# Patient Record
Sex: FEMALE | Race: WHITE | Employment: OTHER | URBAN - METROPOLITAN AREA
[De-identification: names, ages, dates, MRNs, and addresses within clinical notes are randomized per-mention and may not be internally consistent; named-entity substitution may affect disease eponyms.]

---

## 2017-01-04 ENCOUNTER — TELEPHONE (OUTPATIENT)
Dept: INTERNAL MEDICINE | Age: 77
End: 2017-01-04

## 2017-01-11 RX ORDER — RALOXIFENE HYDROCHLORIDE 60 MG/1
TABLET, FILM COATED ORAL
Qty: 90 TABLET | Refills: 3 | Status: SHIPPED | OUTPATIENT
Start: 2017-01-11 | End: 2017-10-05 | Stop reason: SDUPTHER

## 2017-01-19 ENCOUNTER — TELEPHONE (OUTPATIENT)
Dept: INTERNAL MEDICINE | Age: 77
End: 2017-01-19

## 2017-01-23 ENCOUNTER — TELEPHONE (OUTPATIENT)
Dept: INTERNAL MEDICINE | Age: 77
End: 2017-01-23

## 2017-01-25 RX ORDER — TEMAZEPAM 30 MG/1
30 CAPSULE ORAL NIGHTLY PRN
Qty: 90 CAPSULE | Refills: 0 | OUTPATIENT
Start: 2017-01-25 | End: 2017-05-27 | Stop reason: SDUPTHER

## 2017-01-25 RX ORDER — TEMAZEPAM 30 MG/1
CAPSULE ORAL
COMMUNITY
End: 2017-01-25 | Stop reason: SDUPTHER

## 2017-03-06 ENCOUNTER — OFFICE VISIT (OUTPATIENT)
Dept: INTERNAL MEDICINE | Age: 77
End: 2017-03-06

## 2017-03-06 VITALS
TEMPERATURE: 98 F | SYSTOLIC BLOOD PRESSURE: 120 MMHG | HEIGHT: 63 IN | BODY MASS INDEX: 23.39 KG/M2 | DIASTOLIC BLOOD PRESSURE: 70 MMHG | WEIGHT: 132 LBS

## 2017-03-06 DIAGNOSIS — J40 BRONCHITIS: ICD-10-CM

## 2017-03-06 DIAGNOSIS — I10 ESSENTIAL HYPERTENSION: Chronic | ICD-10-CM

## 2017-03-06 PROCEDURE — G8420 CALC BMI NORM PARAMETERS: HCPCS | Performed by: INTERNAL MEDICINE

## 2017-03-06 PROCEDURE — 1090F PRES/ABSN URINE INCON ASSESS: CPT | Performed by: INTERNAL MEDICINE

## 2017-03-06 PROCEDURE — 1036F TOBACCO NON-USER: CPT | Performed by: INTERNAL MEDICINE

## 2017-03-06 PROCEDURE — G8400 PT W/DXA NO RESULTS DOC: HCPCS | Performed by: INTERNAL MEDICINE

## 2017-03-06 PROCEDURE — 1123F ACP DISCUSS/DSCN MKR DOCD: CPT | Performed by: INTERNAL MEDICINE

## 2017-03-06 PROCEDURE — G8484 FLU IMMUNIZE NO ADMIN: HCPCS | Performed by: INTERNAL MEDICINE

## 2017-03-06 PROCEDURE — 99213 OFFICE O/P EST LOW 20 MIN: CPT | Performed by: INTERNAL MEDICINE

## 2017-03-06 PROCEDURE — G8598 ASA/ANTIPLAT THER USED: HCPCS | Performed by: INTERNAL MEDICINE

## 2017-03-06 PROCEDURE — G8428 CUR MEDS NOT DOCUMENT: HCPCS | Performed by: INTERNAL MEDICINE

## 2017-03-06 PROCEDURE — 4040F PNEUMOC VAC/ADMIN/RCVD: CPT | Performed by: INTERNAL MEDICINE

## 2017-03-06 RX ORDER — PROMETHAZINE HYDROCHLORIDE AND CODEINE PHOSPHATE 6.25; 1 MG/5ML; MG/5ML
5 SYRUP ORAL EVERY 4 HOURS PRN
Qty: 240 ML | Refills: 1 | Status: SHIPPED | OUTPATIENT
Start: 2017-03-06 | End: 2017-05-30 | Stop reason: ALTCHOICE

## 2017-03-06 RX ORDER — DOXYCYCLINE HYCLATE 100 MG
100 TABLET ORAL 2 TIMES DAILY
Qty: 20 TABLET | Refills: 0 | Status: SHIPPED | OUTPATIENT
Start: 2017-03-06 | End: 2017-03-16

## 2017-03-09 ENCOUNTER — TELEPHONE (OUTPATIENT)
Dept: INTERNAL MEDICINE | Age: 77
End: 2017-03-09

## 2017-03-09 RX ORDER — ATORVASTATIN CALCIUM 10 MG/1
TABLET, FILM COATED ORAL
Qty: 90 TABLET | Refills: 2 | Status: SHIPPED | OUTPATIENT
Start: 2017-03-09 | End: 2017-05-30 | Stop reason: SDUPTHER

## 2017-03-14 PROBLEM — J40 BRONCHITIS: Status: ACTIVE | Noted: 2017-03-14

## 2017-03-14 ASSESSMENT — ENCOUNTER SYMPTOMS
SHORTNESS OF BREATH: 0
WHEEZING: 0
ABDOMINAL PAIN: 0
CHEST TIGHTNESS: 1
COUGH: 1

## 2017-05-31 RX ORDER — TEMAZEPAM 30 MG/1
30 CAPSULE ORAL NIGHTLY PRN
Qty: 90 CAPSULE | Refills: 0 | OUTPATIENT
Start: 2017-05-31 | End: 2017-10-25 | Stop reason: SDUPTHER

## 2017-07-10 ENCOUNTER — TELEPHONE (OUTPATIENT)
Dept: INTERNAL MEDICINE | Age: 77
End: 2017-07-10

## 2017-07-10 DIAGNOSIS — M81.0 OSTEOPOROSIS: Chronic | ICD-10-CM

## 2017-08-07 ENCOUNTER — HOSPITAL ENCOUNTER (OUTPATIENT)
Dept: ONCOLOGY | Age: 77
Discharge: HOME | End: 2017-07-21
Attending: INTERNAL MEDICINE | Admitting: INTERNAL MEDICINE

## 2017-08-14 ENCOUNTER — HOSPITAL ENCOUNTER (OUTPATIENT)
Dept: ONCOLOGY | Age: 77
Discharge: HOME | End: 2017-08-14
Attending: INTERNAL MEDICINE | Admitting: INTERNAL MEDICINE

## 2017-08-14 VITALS
DIASTOLIC BLOOD PRESSURE: 73 MMHG | TEMPERATURE: 98 F | HEART RATE: 68 BPM | RESPIRATION RATE: 16 BRPM | SYSTOLIC BLOOD PRESSURE: 121 MMHG

## 2017-08-14 RX ORDER — LANOLIN ALCOHOL/MO/W.PET/CERES
1000 CREAM (GRAM) TOPICAL DAILY
COMMUNITY

## 2017-10-04 ENCOUNTER — TELEPHONE (OUTPATIENT)
Dept: INTERNAL MEDICINE | Age: 77
End: 2017-10-04

## 2017-10-04 DIAGNOSIS — I10 ESSENTIAL HYPERTENSION: Chronic | ICD-10-CM

## 2017-10-04 NOTE — TELEPHONE ENCOUNTER
Patient now is going to [place] they are asking if we can send overall the prescription dr. Nighat Cervantes was giving the patient  Please call [facility] pharmacy [phone]

## 2017-10-05 RX ORDER — TEMAZEPAM 30 MG/1
30 CAPSULE ORAL NIGHTLY PRN
Qty: 30 CAPSULE | Refills: 0 | Status: CANCELLED | OUTPATIENT
Start: 2017-10-05

## 2017-10-05 NOTE — TELEPHONE ENCOUNTER
Last seen 3/2017. I called the pharmacy to find out which prescriptions were needed. Aware will only be a 30 day with R1. Left a detailed message for pt to call back to get re-scheduled.

## 2017-10-05 NOTE — TELEPHONE ENCOUNTER
Former Dr Rah Palacio patient. Spoke with pt. Pt aware that she will need to make an appt for future refills. She would like to do some research on the recommendation given to her for new PCP. She will call us back. Also, patient has enough of these medications to last until Dr Andreia Peterson can sign when he comes into the office.

## 2017-10-09 NOTE — TELEPHONE ENCOUNTER
Julio Ramachandran calling back regarding medications that have been pended and wanted to know if and when was Dr Sumaya Shipley going to refill per MA  reviewed tel encounter on 10. 4.17 med's pending -    [place] [phone]

## 2017-10-10 RX ORDER — LANSOPRAZOLE 30 MG/1
CAPSULE, DELAYED RELEASE ORAL
Qty: 30 CAPSULE | Refills: 1 | Status: SHIPPED | OUTPATIENT
Start: 2017-10-10 | End: 2018-04-16 | Stop reason: SDUPTHER

## 2017-10-10 RX ORDER — CARVEDILOL 3.12 MG/1
3.12 TABLET ORAL 2 TIMES DAILY
Qty: 60 TABLET | Refills: 1 | Status: SHIPPED | OUTPATIENT
Start: 2017-10-10 | End: 2018-04-16 | Stop reason: SDUPTHER

## 2017-10-10 RX ORDER — RALOXIFENE HYDROCHLORIDE 60 MG/1
TABLET, FILM COATED ORAL
Qty: 30 TABLET | Refills: 1 | Status: SHIPPED | OUTPATIENT
Start: 2017-10-10 | End: 2018-09-12 | Stop reason: SDUPTHER

## 2017-10-10 NOTE — TELEPHONE ENCOUNTER
Called patient to offer an appointment and she refused to schedule at this time. She is still reviewing her choices but looking into Gabriella Sebastian MD. Will call back to schedule. Advised only 30 day script was given to patient, will need to establish for further refills.

## 2017-10-25 ENCOUNTER — OFFICE VISIT (OUTPATIENT)
Dept: INTERNAL MEDICINE | Age: 77
End: 2017-10-25

## 2017-10-25 VITALS
HEART RATE: 72 BPM | HEIGHT: 63 IN | RESPIRATION RATE: 14 BRPM | DIASTOLIC BLOOD PRESSURE: 84 MMHG | SYSTOLIC BLOOD PRESSURE: 140 MMHG | WEIGHT: 130.4 LBS | BODY MASS INDEX: 23.11 KG/M2

## 2017-10-25 DIAGNOSIS — R35.0 URINARY FREQUENCY: ICD-10-CM

## 2017-10-25 DIAGNOSIS — F33.9 EPISODE OF RECURRENT MAJOR DEPRESSIVE DISORDER, UNSPECIFIED DEPRESSION EPISODE SEVERITY (HCC): ICD-10-CM

## 2017-10-25 DIAGNOSIS — E78.5 HYPERLIPIDEMIA, UNSPECIFIED HYPERLIPIDEMIA TYPE: Primary | Chronic | ICD-10-CM

## 2017-10-25 DIAGNOSIS — F51.01 PRIMARY INSOMNIA: Chronic | ICD-10-CM

## 2017-10-25 DIAGNOSIS — M81.0 AGE-RELATED OSTEOPOROSIS WITHOUT CURRENT PATHOLOGICAL FRACTURE: Chronic | ICD-10-CM

## 2017-10-25 DIAGNOSIS — I73.9 PVD (PERIPHERAL VASCULAR DISEASE) (HCC): ICD-10-CM

## 2017-10-25 DIAGNOSIS — E53.8 B12 DEFICIENCY: ICD-10-CM

## 2017-10-25 DIAGNOSIS — I10 ESSENTIAL HYPERTENSION: Chronic | ICD-10-CM

## 2017-10-25 PROCEDURE — 4005F PHARM THX FOR OP RXD: CPT | Performed by: INTERNAL MEDICINE

## 2017-10-25 PROCEDURE — G8598 ASA/ANTIPLAT THER USED: HCPCS | Performed by: INTERNAL MEDICINE

## 2017-10-25 PROCEDURE — G8420 CALC BMI NORM PARAMETERS: HCPCS | Performed by: INTERNAL MEDICINE

## 2017-10-25 PROCEDURE — 4040F PNEUMOC VAC/ADMIN/RCVD: CPT | Performed by: INTERNAL MEDICINE

## 2017-10-25 PROCEDURE — G8427 DOCREV CUR MEDS BY ELIG CLIN: HCPCS | Performed by: INTERNAL MEDICINE

## 2017-10-25 PROCEDURE — 1036F TOBACCO NON-USER: CPT | Performed by: INTERNAL MEDICINE

## 2017-10-25 PROCEDURE — 99214 OFFICE O/P EST MOD 30 MIN: CPT | Performed by: INTERNAL MEDICINE

## 2017-10-25 PROCEDURE — 1090F PRES/ABSN URINE INCON ASSESS: CPT | Performed by: INTERNAL MEDICINE

## 2017-10-25 PROCEDURE — G8400 PT W/DXA NO RESULTS DOC: HCPCS | Performed by: INTERNAL MEDICINE

## 2017-10-25 PROCEDURE — 1123F ACP DISCUSS/DSCN MKR DOCD: CPT | Performed by: INTERNAL MEDICINE

## 2017-10-25 PROCEDURE — G8484 FLU IMMUNIZE NO ADMIN: HCPCS | Performed by: INTERNAL MEDICINE

## 2017-10-25 RX ORDER — TEMAZEPAM 30 MG/1
30 CAPSULE ORAL NIGHTLY PRN
Qty: 90 CAPSULE | Refills: 0 | Status: SHIPPED | OUTPATIENT
Start: 2017-10-25 | End: 2018-02-22 | Stop reason: SDUPTHER

## 2017-10-25 NOTE — PATIENT INSTRUCTIONS
medicines. These can increase your chances of quitting for good. · Get regular bone-building exercise. Weight-bearing and resistance exercises keep bones healthy by working the muscles and bones against gravity. Start out at an exercise level that feels right for you. Add a little at a time until you can do the following:  ¨ Do 30 minutes of weight-bearing exercise on most days of the week. Walking, jogging, stair climbing, and dancing are good choices. ¨ Do resistance exercises with weights or elastic bands 2 to 3 days a week. · Reduce your risk of falls:  ¨ Wear supportive shoes with low heels and nonslip soles. ¨ Use a cane or walker, if you need it. Use shower chairs and bath benches. Put in handrails on stairways, around your shower or tub area, and near the toilet. ¨ Keep stairs, porches, and walkways well lit. Use night-lights. ¨ Remove throw rugs and other objects that are in the way. ¨ Avoid icy, wet, or slippery surfaces. ¨ Keep a cordless phone and a flashlight with new batteries by your bed. When should you call for help? Watch closely for changes in your health, and be sure to contact your doctor if you have any problems. Where can you learn more? Go to https://elmenus.[email or web address] org and sign in to your [facility] ([facility]) account. Enter K100 in the [facility] box to learn more about \"Osteoporosis: Care Instructions. \"     If you do not have an account, please click on the \"Sign Up Now\" link. Current as of: August 9, 2016  Content Version: 11.3  © 0819-1253 Healthwise, Incorporated. Care instructions adapted under license by [facility] [place] ([facility]). If you have questions about a medical condition or this instruction, always ask your healthcare professional. [place] [address] any warranty or liability for your use of this information.        Patient Education          denosumab (Prolia)  Pronunciation:  víctor gomez  Brand:  Sivakumar  What is the most important information I may cause bone loss (osteonecrosis) in the jaw. Symptoms include jaw pain or numbness, red or swollen gums, loose teeth, gum infection, or slow healing after dental work. Osteonecrosis of the jaw may be more likely if you have cancer or received chemotherapy, radiation, or steroids. Other risk factors include blood clotting disorders, anemia (low red blood cells), and a pre-existing dental problem. FDA pregnancy category X. Denosumab can harm an unborn baby or cause birth defects. Do not use Prolia if you are pregnant. Tell your doctor right away if you become pregnant during treatment. If you are pregnant, your name may be listed on a pregnancy registry. This is to track the outcome of the pregnancy and to evaluate any effects of denosumab on the baby. It is not known whether denosumab passes into breast milk or if it could harm a nursing baby. This medicine may also slow the production of breast milk. You should not breast-feed while using this medicine. How is Prolia given? Denosumab is injected under the skin of your stomach, upper thigh, or upper arm. A healthcare provider will give you this injection. Prolia is usually given once every 6 months. Your doctor may have you take extra calcium and vitamin D while you are being treated with denosumab. Take only the amount of calcium and vitamin D that your doctor has prescribed. If you need to have any dental work (especially surgery), tell the dentist ahead of time that you are receiving denosumab. You may need to stop using the medicine for a short time. Pay special attention to your dental hygiene. Brush and floss your teeth regularly while receiving this medication. You may need to have a dental exam before you begin treatment with Prolia. Follow your doctor's instructions. If you keep this medicine at home, store it in the original container in a refrigerator. Protect from light and do not freeze.   You may take the medicine out of the refrigerator and allow it to reach room temperature before the injection is given. Do not heat the medicine before using. Do not shake the prefilled syringe or you may ruin the medicine. Do not use the medicine if it looks cloudy or has particles in it. Call your pharmacist for a new prescription. Each prefilled syringe of this medicine is for one use only. Throw away after one use, even if there is still some medicine left in it after injecting your dose. After you have taken Prolia out of the refrigerator, you may keep it at room temperature for up to 14 days. Store in the original container away from heat and light. Use a disposable needle and syringe only once. Follow any state or local laws about throwing away used needles and syringes. Use a puncture-proof \"sharps\" disposal container (ask your pharmacist where to get one and how to throw it away). Keep this container out of the reach of children and pets. Do not share this medicine with another person, even if they have the same symptoms you have. What happens if I miss a dose? Call your doctor for instructions if you miss a dose or miss an appointment for your Prolia injection. You should receive your missed injection as soon as possible. What happens if I overdose? Seek emergency medical attention or call the Poison Help line at 1-522.948.8814. What should I avoid while receiving Prolia? Follow your doctor's instructions about any restrictions on food, beverages, or activity. What are the possible side effects of Prolia? Get emergency medical help if you have any of these signs of an allergic reaction: hives, itching, rash; difficult breathing, feeling light-headed; swelling of your face, lips, tongue, or throat.   Call your doctor at once if you have:  · new or unusual pain in your thigh, hip, or groin;  · severe pain in your joints, muscles, or bones;  · skin problems such as dryness, peeling, redness, itching, blisters, bumps, oozing, or crusting; or  · low levels of calcium in your blood (hypocalcemia) --numbness or tingly feeling around your mouth or in your fingers or toes, muscle tightness or contraction, overactive reflexes. Serious infections may occur during treatment with Prolia. Call your doctor right away if you have signs of infection such as:  · fever, chills, night sweats;  · swelling, pain, tenderness, warmth, or redness anywhere on your body;  · pain or burning when you urinate;  · increased or urgent need to urinate;  · severe stomach pain; or  · cough, feeling short of breath. Common side effects may include:  · bladder infection (painful or difficult urination);  · back pain, muscle pain; or  · pain in your arms or legs. This is not a complete list of side effects and others may occur. Call your doctor for medical advice about side effects. You may report side effects to FDA at 1-048-FDA-7206. What other drugs will affect Prolia? Other drugs may interact with denosumab, including prescription and over-the-counter medicines, vitamins, and herbal products. Tell each of your health care providers about all medicines you use now and any medicine you start or stop using. Where can I get more information? Your doctor or pharmacist can provide more information about denosumab (Prolia). Remember, keep this and all other medicines out of the reach of children, never share your medicines with others, and use this medication only for the indication prescribed. Every effort has been made to ensure that the information provided by [address] is accurate, up-to-date, and complete, but no guarantee is made to that effect. Drug information contained herein may be time sensitive. Mult information has been compiled for use by healthcare practitioners and consumers in the [place] and therefore [employer] does not warrant that uses outside of the [place] are appropriate, unless specifically indicated otherwise.  [employer]'s

## 2017-10-25 NOTE — PROGRESS NOTES
Spondylolisthesis 07/26/2012    L4-5 stenosis / Epidural Steroid injection    Urinary incontinence     Vocal cord dysfunction        Past Surgical History:   Procedure Laterality Date    ABDOMINAL EXPLORATION SURGERY  1986    BREAST LUMPECTOMY  2002    left breast    BREAST RECONSTRUCTION      BREAST REDUCTION SURGERY  1981    COLONOSCOPY  August 25, 2009    Dr. Edna Villa  01/18/2017    Dr. Calderon Montague  February 27, 2013    Dr. Kristel Dodd --- [facility]  July 31, 2012    Dr. Rosa Lopez, BILATERAL      JESS AND [address]         Social History   Substance Use Topics    Smoking status: Former Smoker     Types: Cigarettes    Smokeless tobacco: Never Used      Comment: quit smoking in 2004    Alcohol use Yes      Comment: social       Family History   Problem Relation Age of Onset    Cancer Daughter 50     transitional cell CA in the fallopian tube    Dementia Brother 68

## 2017-11-21 ENCOUNTER — HOSPITAL ENCOUNTER (OUTPATIENT)
Dept: GENERAL RADIOLOGY | Age: 77
Discharge: HOME | End: 2017-11-21
Attending: INTERNAL MEDICINE | Admitting: INTERNAL MEDICINE

## 2017-11-21 DIAGNOSIS — M81.0 AGE-RELATED OSTEOPOROSIS WITHOUT CURRENT PATHOLOGICAL FRACTURE: ICD-10-CM

## 2017-12-01 DIAGNOSIS — M81.0 AGE-RELATED OSTEOPOROSIS WITHOUT CURRENT PATHOLOGICAL FRACTURE: Chronic | ICD-10-CM

## 2017-12-01 DIAGNOSIS — I10 ESSENTIAL HYPERTENSION: Chronic | ICD-10-CM

## 2017-12-01 DIAGNOSIS — R35.0 URINARY FREQUENCY: ICD-10-CM

## 2017-12-01 DIAGNOSIS — E53.8 B12 DEFICIENCY: ICD-10-CM

## 2017-12-01 DIAGNOSIS — E78.5 HYPERLIPIDEMIA, UNSPECIFIED HYPERLIPIDEMIA TYPE: Chronic | ICD-10-CM

## 2017-12-02 LAB
A/G RATIO: 2.4 (ref 1.1–2.2)
ALBUMIN SERPL-MCNC: 4.3 G/DL (ref 3.4–5)
ALP BLD-CCNC: 63 U/L (ref 40–129)
ALT SERPL-CCNC: 12 U/L (ref 10–40)
ANION GAP SERPL CALCULATED.3IONS-SCNC: 15 MMOL/L (ref 3–16)
AST SERPL-CCNC: 24 U/L (ref 15–37)
BASOPHILS ABSOLUTE: 0 K/UL (ref 0–0.2)
BASOPHILS RELATIVE PERCENT: 0.6 %
BILIRUB SERPL-MCNC: 0.6 MG/DL (ref 0–1)
BILIRUBIN URINE: NEGATIVE
BLOOD, URINE: NEGATIVE
BUN BLDV-MCNC: 19 MG/DL (ref 7–20)
CALCIUM SERPL-MCNC: 9.2 MG/DL (ref 8.3–10.6)
CHLORIDE BLD-SCNC: 105 MMOL/L (ref 99–110)
CHOLESTEROL, FASTING: 200 MG/DL (ref 0–199)
CLARITY: CLEAR
CO2: 25 MMOL/L (ref 21–32)
COLOR: YELLOW
CREAT SERPL-MCNC: 0.7 MG/DL (ref 0.6–1.2)
EOSINOPHILS ABSOLUTE: 0.2 K/UL (ref 0–0.6)
EOSINOPHILS RELATIVE PERCENT: 4 %
EPITHELIAL CELLS, UA: 1 /HPF (ref 0–5)
GFR AFRICAN AMERICAN: >60
GFR NON-AFRICAN AMERICAN: >60
GLOBULIN: 1.8 G/DL
GLUCOSE BLD-MCNC: 99 MG/DL (ref 70–99)
GLUCOSE URINE: NEGATIVE MG/DL
HCT VFR BLD CALC: 44.9 % (ref 36–48)
HDLC SERPL-MCNC: 87 MG/DL (ref 40–60)
HEMOGLOBIN: 14.5 G/DL (ref 12–16)
HYALINE CASTS: 1 /LPF (ref 0–8)
KETONES, URINE: NEGATIVE MG/DL
LDL CHOLESTEROL CALCULATED: 97 MG/DL
LEUKOCYTE ESTERASE, URINE: NEGATIVE
LYMPHOCYTES ABSOLUTE: 1.1 K/UL (ref 1–5.1)
LYMPHOCYTES RELATIVE PERCENT: 27.1 %
MCH RBC QN AUTO: 30.5 PG (ref 26–34)
MCHC RBC AUTO-ENTMCNC: 32.3 G/DL (ref 31–36)
MCV RBC AUTO: 94.4 FL (ref 80–100)
MICROSCOPIC EXAMINATION: NORMAL
MONOCYTES ABSOLUTE: 0.3 K/UL (ref 0–1.3)
MONOCYTES RELATIVE PERCENT: 8.7 %
NEUTROPHILS ABSOLUTE: 2.4 K/UL (ref 1.7–7.7)
NEUTROPHILS RELATIVE PERCENT: 59.6 %
NITRITE, URINE: NEGATIVE
PDW BLD-RTO: 14 % (ref 12.4–15.4)
PH UA: 5
PLATELET # BLD: 148 K/UL (ref 135–450)
PMV BLD AUTO: 10.5 FL (ref 5–10.5)
POTASSIUM SERPL-SCNC: 5.1 MMOL/L (ref 3.5–5.1)
PROTEIN UA: NEGATIVE MG/DL
RBC # BLD: 4.75 M/UL (ref 4–5.2)
RBC UA: 2 /HPF (ref 0–4)
SODIUM BLD-SCNC: 145 MMOL/L (ref 136–145)
SPECIFIC GRAVITY UA: 1.02
TOTAL PROTEIN: 6.1 G/DL (ref 6.4–8.2)
TRIGLYCERIDE, FASTING: 78 MG/DL (ref 0–150)
UROBILINOGEN, URINE: 0.2 E.U./DL
VITAMIN B-12: 810 PG/ML (ref 211–911)
VLDLC SERPL CALC-MCNC: 16 MG/DL
WBC # BLD: 4 K/UL (ref 4–11)
WBC UA: 3 /HPF (ref 0–5)

## 2017-12-05 ENCOUNTER — TELEPHONE (OUTPATIENT)
Dept: INTERNAL MEDICINE | Age: 77
End: 2017-12-05

## 2018-01-15 ENCOUNTER — TELEPHONE (OUTPATIENT)
Dept: INTERNAL MEDICINE CLINIC | Age: 78
End: 2018-01-15

## 2018-01-15 NOTE — TELEPHONE ENCOUNTER
[facility] called to obtain PA on lansoprazole (PREVACID) 30 MG delayed release capsule. This medication was approved with start date of 1/15/18-1/15/19.  Case # [id number]

## 2018-02-02 ENCOUNTER — OFFICE VISIT (OUTPATIENT)
Dept: INTERNAL MEDICINE CLINIC | Age: 78
End: 2018-02-02

## 2018-02-02 VITALS
SYSTOLIC BLOOD PRESSURE: 118 MMHG | WEIGHT: 133 LBS | HEART RATE: 77 BPM | OXYGEN SATURATION: 95 % | DIASTOLIC BLOOD PRESSURE: 64 MMHG | BODY MASS INDEX: 23.56 KG/M2

## 2018-02-02 DIAGNOSIS — E78.5 HYPERLIPIDEMIA, UNSPECIFIED HYPERLIPIDEMIA TYPE: Chronic | ICD-10-CM

## 2018-02-02 DIAGNOSIS — F01.53 VASCULAR DEMENTIA WITH DEPRESSED MOOD: ICD-10-CM

## 2018-02-02 DIAGNOSIS — I10 ESSENTIAL HYPERTENSION: Chronic | ICD-10-CM

## 2018-02-02 DIAGNOSIS — J44.9 OBSTRUCTIVE CHRONIC BRONCHITIS WITHOUT EXACERBATION (HCC): ICD-10-CM

## 2018-02-02 DIAGNOSIS — I73.9 INTERMITTENT CLAUDICATION (HCC): ICD-10-CM

## 2018-02-02 DIAGNOSIS — M81.0 AGE-RELATED OSTEOPOROSIS WITHOUT CURRENT PATHOLOGICAL FRACTURE: Chronic | ICD-10-CM

## 2018-02-02 PROCEDURE — 1036F TOBACCO NON-USER: CPT | Performed by: INTERNAL MEDICINE

## 2018-02-02 PROCEDURE — G8598 ASA/ANTIPLAT THER USED: HCPCS | Performed by: INTERNAL MEDICINE

## 2018-02-02 PROCEDURE — 4040F PNEUMOC VAC/ADMIN/RCVD: CPT | Performed by: INTERNAL MEDICINE

## 2018-02-02 PROCEDURE — 3023F SPIROM DOC REV: CPT | Performed by: INTERNAL MEDICINE

## 2018-02-02 PROCEDURE — 1090F PRES/ABSN URINE INCON ASSESS: CPT | Performed by: INTERNAL MEDICINE

## 2018-02-02 PROCEDURE — G8428 CUR MEDS NOT DOCUMENT: HCPCS | Performed by: INTERNAL MEDICINE

## 2018-02-02 PROCEDURE — G8484 FLU IMMUNIZE NO ADMIN: HCPCS | Performed by: INTERNAL MEDICINE

## 2018-02-02 PROCEDURE — 99214 OFFICE O/P EST MOD 30 MIN: CPT | Performed by: INTERNAL MEDICINE

## 2018-02-02 PROCEDURE — G8926 SPIRO NO PERF OR DOC: HCPCS | Performed by: INTERNAL MEDICINE

## 2018-02-02 PROCEDURE — G8399 PT W/DXA RESULTS DOCUMENT: HCPCS | Performed by: INTERNAL MEDICINE

## 2018-02-02 PROCEDURE — 1123F ACP DISCUSS/DSCN MKR DOCD: CPT | Performed by: INTERNAL MEDICINE

## 2018-02-02 PROCEDURE — G8420 CALC BMI NORM PARAMETERS: HCPCS | Performed by: INTERNAL MEDICINE

## 2018-02-02 NOTE — PATIENT INSTRUCTIONS
Patient Education        Learning About Saving Energy When You Have a Chronic Condition  Introduction    Everyday tasks can be tiring when you have COPD, heart failure, or another long-term (chronic) condition. You may feel at times that you've lost your ability to live your life. But learning to conserve, or save, your energy can help you be less tired. Conserving your energy means finding ways of doing daily activities with as little effort as possible. With some small changes in the way you do things, you can get your tasks done more easily. Some treatments are available that might help. Pulmonary rehabilitation can teach you ways to breathe easier. Cardiac rehabilitation can help make your heart stronger. You also may want to see an occupational or physical therapist. The therapist can give you more tips on building strength and moving with less effort. What can you do to conserve your energy? Planning  · Make a list of what you have to do every day. Group the tasks by location. · Do all the chores in one part of your house around the same time. · Go out for errands or do chores at the time of day when you have the most energy. · Plan rest periods into your day. Getting things done  · Sit down as often as you can when you get dressed, do chores, or cook. · Use a cart with wheels to roll items, such as laundry, from one room to another. · Push or slide boxes or other large items instead of lifting them. Reaching and bending  · Put things you use the most on shelves that are at the level of your waist or shoulder. · Use long-handled grabbers or other tools to reach items on a high shelf or to  things off the floor. Use long-handled dusters when you clean the house. · Use a raised toilet seat to avoid bending too far to sit or stand up. Eating  · Eat several small meals instead of three larger meals. · If you get too tired to eat much, try to choose healthy foods that have more calories.

## 2018-02-22 ENCOUNTER — TELEPHONE (OUTPATIENT)
Dept: INTERNAL MEDICINE CLINIC | Age: 78
End: 2018-02-22

## 2018-02-22 RX ORDER — TEMAZEPAM 30 MG/1
30 CAPSULE ORAL NIGHTLY PRN
Qty: 90 CAPSULE | Refills: 0 | Status: SHIPPED | OUTPATIENT
Start: 2018-02-22 | End: 2019-02-22

## 2018-02-22 NOTE — TELEPHONE ENCOUNTER
Pt requesting refill of temazepam (RESTORIL) 30 MG capsule be sent to [facility]. Please call pt when this is completed.

## 2018-04-11 ENCOUNTER — HOSPITAL ENCOUNTER (OUTPATIENT)
Dept: ONCOLOGY | Age: 78
Discharge: HOME | End: 2018-04-17
Attending: INTERNAL MEDICINE | Admitting: INTERNAL MEDICINE

## 2018-04-11 VITALS
TEMPERATURE: 97.9 F | DIASTOLIC BLOOD PRESSURE: 75 MMHG | SYSTOLIC BLOOD PRESSURE: 122 MMHG | RESPIRATION RATE: 18 BRPM | HEART RATE: 73 BPM

## 2018-04-16 DIAGNOSIS — I10 ESSENTIAL HYPERTENSION: Chronic | ICD-10-CM

## 2018-04-16 RX ORDER — CARVEDILOL 3.12 MG/1
3.12 TABLET ORAL 2 TIMES DAILY
Qty: 60 TABLET | Refills: 1 | Status: SHIPPED | OUTPATIENT
Start: 2018-04-16 | End: 2018-06-19 | Stop reason: SDUPTHER

## 2018-04-16 RX ORDER — LANSOPRAZOLE 30 MG/1
CAPSULE, DELAYED RELEASE ORAL
Qty: 30 CAPSULE | Refills: 1 | Status: SHIPPED | OUTPATIENT
Start: 2018-04-16 | End: 2021-05-20 | Stop reason: CLARIF

## 2018-06-04 ENCOUNTER — TELEPHONE (OUTPATIENT)
Dept: INTERNAL MEDICINE CLINIC | Age: 78
End: 2018-06-04

## 2018-06-04 RX ORDER — TEMAZEPAM 30 MG/1
30 CAPSULE ORAL NIGHTLY PRN
Qty: 90 CAPSULE | Refills: 0 | OUTPATIENT
Start: 2018-06-04 | End: 2019-06-04

## 2018-06-19 DIAGNOSIS — I10 ESSENTIAL HYPERTENSION: Chronic | ICD-10-CM

## 2018-06-19 RX ORDER — CARVEDILOL 3.12 MG/1
TABLET ORAL
Qty: 60 TABLET | Refills: 1 | Status: SHIPPED | OUTPATIENT
Start: 2018-06-19 | End: 2021-05-20 | Stop reason: CLARIF

## 2018-09-13 RX ORDER — RALOXIFENE HYDROCHLORIDE 60 MG/1
TABLET, FILM COATED ORAL
Qty: 30 TABLET | Refills: 1 | Status: SHIPPED | OUTPATIENT
Start: 2018-09-13 | End: 2021-05-20 | Stop reason: CLARIF

## 2020-06-23 ENCOUNTER — APPOINTMENT (OUTPATIENT)
Dept: CT IMAGING | Age: 80
DRG: 312 | End: 2020-06-23
Payer: MEDICARE

## 2020-06-23 ENCOUNTER — APPOINTMENT (OUTPATIENT)
Dept: GENERAL RADIOLOGY | Age: 80
DRG: 312 | End: 2020-06-23
Payer: MEDICARE

## 2020-06-23 ENCOUNTER — HOSPITAL ENCOUNTER (INPATIENT)
Age: 80
LOS: 1 days | Discharge: HOME | DRG: 312 | End: 2020-06-25
Attending: EMERGENCY MEDICINE | Admitting: EMERGENCY MEDICINE
Payer: MEDICARE

## 2020-06-23 PROBLEM — R55 SYNCOPE AND COLLAPSE: Status: ACTIVE | Noted: 2020-06-23

## 2020-06-23 LAB
ANION GAP SERPL CALCULATED.3IONS-SCNC: 12 MMOL/L (ref 3–16)
BASOPHILS ABSOLUTE: 0 K/UL (ref 0–0.2)
BASOPHILS RELATIVE PERCENT: 0.6 %
BILIRUBIN URINE: NEGATIVE
BLOOD, URINE: NEGATIVE
BUN BLDV-MCNC: 21 MG/DL (ref 7–20)
CALCIUM SERPL-MCNC: 9.3 MG/DL (ref 8.3–10.6)
CHLORIDE BLD-SCNC: 101 MMOL/L (ref 99–110)
CLARITY: CLEAR
CO2: 22 MMOL/L (ref 21–32)
COLOR: YELLOW
CREAT SERPL-MCNC: 0.9 MG/DL (ref 0.6–1.2)
EOSINOPHILS ABSOLUTE: 0.1 K/UL (ref 0–0.6)
EOSINOPHILS RELATIVE PERCENT: 3.2 %
ETHANOL: NORMAL MG/DL (ref 0–0.08)
GFR AFRICAN AMERICAN: >60
GFR NON-AFRICAN AMERICAN: >60
GLUCOSE BLD-MCNC: 105 MG/DL (ref 70–99)
GLUCOSE BLD-MCNC: 97 MG/DL (ref 70–99)
GLUCOSE URINE: NEGATIVE MG/DL
HCT VFR BLD CALC: 44 % (ref 36–48)
HEMOGLOBIN: 14.3 G/DL (ref 12–16)
INR BLD: 1.01 (ref 0.86–1.14)
KETONES, URINE: NEGATIVE MG/DL
LEUKOCYTE ESTERASE, URINE: NEGATIVE
LYMPHOCYTES ABSOLUTE: 0.8 K/UL (ref 1–5.1)
LYMPHOCYTES RELATIVE PERCENT: 20.5 %
MCH RBC QN AUTO: 30 PG (ref 26–34)
MCHC RBC AUTO-ENTMCNC: 32.5 G/DL (ref 31–36)
MCV RBC AUTO: 92.3 FL (ref 80–100)
MICROSCOPIC EXAMINATION: NORMAL
MONOCYTES ABSOLUTE: 0.3 K/UL (ref 0–1.3)
MONOCYTES RELATIVE PERCENT: 8.5 %
NEUTROPHILS ABSOLUTE: 2.7 K/UL (ref 1.7–7.7)
NEUTROPHILS RELATIVE PERCENT: 67.2 %
NITRITE, URINE: NEGATIVE
PDW BLD-RTO: 13.6 % (ref 12.4–15.4)
PERFORMED ON: ABNORMAL
PH UA: 6 (ref 5–8)
PLATELET # BLD: 126 K/UL (ref 135–450)
PMV BLD AUTO: 9.8 FL (ref 5–10.5)
POTASSIUM SERPL-SCNC: 4.7 MMOL/L (ref 3.5–5.1)
PRO-BNP: 383 PG/ML (ref 0–449)
PROTEIN UA: NEGATIVE MG/DL
PROTHROMBIN TIME: 11.7 SEC (ref 10–13.2)
RBC # BLD: 4.77 M/UL (ref 4–5.2)
SODIUM BLD-SCNC: 135 MMOL/L (ref 136–145)
SPECIFIC GRAVITY UA: 1.02 (ref 1–1.03)
TROPONIN: <0.01 NG/ML
TROPONIN: <0.01 NG/ML
TSH REFLEX: 2.85 UIU/ML (ref 0.27–4.2)
URINE TYPE: NORMAL
UROBILINOGEN, URINE: 0.2 E.U./DL
WBC # BLD: 4 K/UL (ref 4–11)

## 2020-06-23 PROCEDURE — G0378 HOSPITAL OBSERVATION PER HR: HCPCS

## 2020-06-23 PROCEDURE — 83880 ASSAY OF NATRIURETIC PEPTIDE: CPT

## 2020-06-23 PROCEDURE — 85025 COMPLETE CBC W/AUTO DIFF WBC: CPT

## 2020-06-23 PROCEDURE — 36415 COLL VENOUS BLD VENIPUNCTURE: CPT

## 2020-06-23 PROCEDURE — 80048 BASIC METABOLIC PNL TOTAL CA: CPT

## 2020-06-23 PROCEDURE — 70450 CT HEAD/BRAIN W/O DYE: CPT

## 2020-06-23 PROCEDURE — 6360000002 HC RX W HCPCS: Performed by: INTERNAL MEDICINE

## 2020-06-23 PROCEDURE — 85610 PROTHROMBIN TIME: CPT

## 2020-06-23 PROCEDURE — 93005 ELECTROCARDIOGRAM TRACING: CPT | Performed by: STUDENT IN AN ORGANIZED HEALTH CARE EDUCATION/TRAINING PROGRAM

## 2020-06-23 PROCEDURE — 84443 ASSAY THYROID STIM HORMONE: CPT

## 2020-06-23 PROCEDURE — 99285 EMERGENCY DEPT VISIT HI MDM: CPT

## 2020-06-23 PROCEDURE — G0480 DRUG TEST DEF 1-7 CLASSES: HCPCS

## 2020-06-23 PROCEDURE — 6370000000 HC RX 637 (ALT 250 FOR IP): Performed by: INTERNAL MEDICINE

## 2020-06-23 PROCEDURE — 81003 URINALYSIS AUTO W/O SCOPE: CPT

## 2020-06-23 PROCEDURE — 71046 X-RAY EXAM CHEST 2 VIEWS: CPT

## 2020-06-23 PROCEDURE — 2580000003 HC RX 258: Performed by: INTERNAL MEDICINE

## 2020-06-23 PROCEDURE — 84484 ASSAY OF TROPONIN QUANT: CPT

## 2020-06-23 PROCEDURE — 96372 THER/PROPH/DIAG INJ SC/IM: CPT

## 2020-06-23 PROCEDURE — U0003 INFECTIOUS AGENT DETECTION BY NUCLEIC ACID (DNA OR RNA); SEVERE ACUTE RESPIRATORY SYNDROME CORONAVIRUS 2 (SARS-COV-2) (CORONAVIRUS DISEASE [COVID-19]), AMPLIFIED PROBE TECHNIQUE, MAKING USE OF HIGH THROUGHPUT TECHNOLOGIES AS DESCRIBED BY CMS-2020-01-R: HCPCS

## 2020-06-23 RX ORDER — POLYVINYL ALCOHOL 14 MG/ML
1 SOLUTION/ DROPS OPHTHALMIC 2 TIMES DAILY
Status: DISCONTINUED | OUTPATIENT
Start: 2020-06-23 | End: 2020-06-25 | Stop reason: HOSPADM

## 2020-06-23 RX ORDER — ACETAMINOPHEN 650 MG/1
650 SUPPOSITORY RECTAL EVERY 6 HOURS PRN
Status: DISCONTINUED | OUTPATIENT
Start: 2020-06-23 | End: 2020-06-25 | Stop reason: HOSPADM

## 2020-06-23 RX ORDER — LORAZEPAM 0.5 MG/1
0.5 TABLET ORAL NIGHTLY PRN
Status: DISCONTINUED | OUTPATIENT
Start: 2020-06-24 | End: 2020-06-24

## 2020-06-23 RX ORDER — MEMANTINE HYDROCHLORIDE 5 MG/1
10 TABLET ORAL 2 TIMES DAILY
Status: DISCONTINUED | OUTPATIENT
Start: 2020-06-24 | End: 2020-06-25 | Stop reason: HOSPADM

## 2020-06-23 RX ORDER — PROMETHAZINE HYDROCHLORIDE 25 MG/1
12.5 TABLET ORAL EVERY 6 HOURS PRN
Status: DISCONTINUED | OUTPATIENT
Start: 2020-06-23 | End: 2020-06-25 | Stop reason: HOSPADM

## 2020-06-23 RX ORDER — SODIUM CHLORIDE 0.9 % (FLUSH) 0.9 %
10 SYRINGE (ML) INJECTION EVERY 12 HOURS SCHEDULED
Status: DISCONTINUED | OUTPATIENT
Start: 2020-06-23 | End: 2020-06-25 | Stop reason: HOSPADM

## 2020-06-23 RX ORDER — VITAMIN B COMPLEX
2000 TABLET ORAL DAILY
Status: DISCONTINUED | OUTPATIENT
Start: 2020-06-23 | End: 2020-06-25 | Stop reason: HOSPADM

## 2020-06-23 RX ORDER — LANOLIN ALCOHOL/MO/W.PET/CERES
500 CREAM (GRAM) TOPICAL DAILY
Status: DISCONTINUED | OUTPATIENT
Start: 2020-06-23 | End: 2020-06-25 | Stop reason: HOSPADM

## 2020-06-23 RX ORDER — PANTOPRAZOLE SODIUM 40 MG/1
40 TABLET, DELAYED RELEASE ORAL DAILY
Status: DISCONTINUED | OUTPATIENT
Start: 2020-06-23 | End: 2020-06-25 | Stop reason: HOSPADM

## 2020-06-23 RX ORDER — ATORVASTATIN CALCIUM 10 MG/1
10 TABLET, FILM COATED ORAL NIGHTLY
Status: DISCONTINUED | OUTPATIENT
Start: 2020-06-23 | End: 2020-06-25 | Stop reason: HOSPADM

## 2020-06-23 RX ORDER — ONDANSETRON 2 MG/ML
4 INJECTION INTRAMUSCULAR; INTRAVENOUS EVERY 6 HOURS PRN
Status: DISCONTINUED | OUTPATIENT
Start: 2020-06-23 | End: 2020-06-25 | Stop reason: HOSPADM

## 2020-06-23 RX ORDER — TEMAZEPAM 30 MG/1
30 CAPSULE ORAL NIGHTLY
COMMUNITY

## 2020-06-23 RX ORDER — ASCORBIC ACID 500 MG
500 TABLET ORAL DAILY
Status: DISCONTINUED | OUTPATIENT
Start: 2020-06-23 | End: 2020-06-25 | Stop reason: HOSPADM

## 2020-06-23 RX ORDER — MEMANTINE HYDROCHLORIDE 10 MG/1
10 TABLET ORAL 2 TIMES DAILY
COMMUNITY

## 2020-06-23 RX ORDER — ASPIRIN 81 MG/1
81 TABLET ORAL DAILY
Status: DISCONTINUED | OUTPATIENT
Start: 2020-06-23 | End: 2020-06-25 | Stop reason: HOSPADM

## 2020-06-23 RX ORDER — SENNA AND DOCUSATE SODIUM 50; 8.6 MG/1; MG/1
1 TABLET, FILM COATED ORAL NIGHTLY PRN
Status: DISCONTINUED | OUTPATIENT
Start: 2020-06-23 | End: 2020-06-25 | Stop reason: HOSPADM

## 2020-06-23 RX ORDER — CARVEDILOL 3.12 MG/1
3.12 TABLET ORAL 2 TIMES DAILY WITH MEALS
Status: DISCONTINUED | OUTPATIENT
Start: 2020-06-23 | End: 2020-06-25 | Stop reason: HOSPADM

## 2020-06-23 RX ORDER — POLYETHYLENE GLYCOL 3350 17 G/17G
17 POWDER, FOR SOLUTION ORAL DAILY PRN
Status: DISCONTINUED | OUTPATIENT
Start: 2020-06-23 | End: 2020-06-25 | Stop reason: HOSPADM

## 2020-06-23 RX ORDER — FAMOTIDINE 20 MG/1
40 TABLET, FILM COATED ORAL DAILY
Status: DISCONTINUED | OUTPATIENT
Start: 2020-06-23 | End: 2020-06-23 | Stop reason: SDUPTHER

## 2020-06-23 RX ORDER — ACETAMINOPHEN 325 MG/1
650 TABLET ORAL EVERY 6 HOURS PRN
Status: DISCONTINUED | OUTPATIENT
Start: 2020-06-23 | End: 2020-06-25 | Stop reason: HOSPADM

## 2020-06-23 RX ORDER — SODIUM CHLORIDE 0.9 % (FLUSH) 0.9 %
10 SYRINGE (ML) INJECTION PRN
Status: DISCONTINUED | OUTPATIENT
Start: 2020-06-23 | End: 2020-06-25 | Stop reason: HOSPADM

## 2020-06-23 RX ORDER — TROSPIUM CHLORIDE 20 MG/1
20 TABLET, FILM COATED ORAL 2 TIMES DAILY
Status: DISCONTINUED | OUTPATIENT
Start: 2020-06-23 | End: 2020-06-25 | Stop reason: HOSPADM

## 2020-06-23 RX ORDER — RALOXIFENE HYDROCHLORIDE 60 MG/1
1 TABLET, FILM COATED ORAL DAILY
Status: DISCONTINUED | OUTPATIENT
Start: 2020-06-23 | End: 2020-06-23 | Stop reason: RX

## 2020-06-23 RX ADMIN — ENOXAPARIN SODIUM 40 MG: 40 INJECTION SUBCUTANEOUS at 21:13

## 2020-06-23 RX ADMIN — CYANOCOBALAMIN TAB 1000 MCG 500 MCG: 1000 TAB at 21:06

## 2020-06-23 RX ADMIN — ASPIRIN 81 MG: 81 TABLET, COATED ORAL at 21:07

## 2020-06-23 RX ADMIN — Medication 500 MG: at 21:06

## 2020-06-23 RX ADMIN — MELATONIN 2000 UNITS: at 21:06

## 2020-06-23 RX ADMIN — POLYVINYL ALCOHOL 1 DROP: 14 SOLUTION/ DROPS OPHTHALMIC at 23:56

## 2020-06-23 RX ADMIN — PANTOPRAZOLE SODIUM 40 MG: 40 TABLET, DELAYED RELEASE ORAL at 21:07

## 2020-06-23 RX ADMIN — CARVEDILOL 3.12 MG: 3.12 TABLET, FILM COATED ORAL at 21:06

## 2020-06-23 RX ADMIN — Medication 1 TABLET: at 21:06

## 2020-06-23 RX ADMIN — Medication 10 ML: at 21:15

## 2020-06-23 RX ADMIN — SERTRALINE HYDROCHLORIDE 50 MG: 50 TABLET ORAL at 21:07

## 2020-06-23 RX ADMIN — ATORVASTATIN CALCIUM 10 MG: 10 TABLET, FILM COATED ORAL at 21:06

## 2020-06-23 RX ADMIN — TROSPIUM CHLORIDE 20 MG: 20 TABLET, FILM COATED ORAL at 23:56

## 2020-06-23 NOTE — ED PROVIDER NOTES
ED Attending Attestation Note     Date of evaluation: 6/23/2020    This patient was seen by the resident. I have seen and examined the patient, agree with the workup, evaluation, management and diagnosis. The care plan has been discussed. I have reviewed the ECG and concur with the resident's interpretation. My assessment reveals an overall well-appearing elderly patient, pleasantly conversational, in no acute distress. She was sent from her nursing facility due to an episode in which she was unable to sit upright, leaning to the side, and bumped her head on the wall. She has no external evidence of trauma, and did not frankly fall. She has a normal neurologic examination seated in the bed, is briskly alert and interactive, has no weakness, sensory changes, visual or speech changes, or limb or truncal ataxia. However, she is somewhat unsteady with attempted ambulation initially in the emergency department. Work-up has been initiated, and we are calling the nursing facility for further history.          Luis Lema MD  06/23/20 [id number]

## 2020-06-23 NOTE — ED PROVIDER NOTES
calcification ([address] Arteriosclerosis, Breast cancer ([address] Carcinoma in situ of breast, Cardiomegaly, Carotid artery disease ([address] Cerebrovascular disease, unspecified, Chest pain, COPD (chronic obstructive pulmonary disease) ([address] Coronary artery calcification seen on CT scan, Decreased libido, Depression, Dysthymia, Essential hypertension, Gastroesophageal reflux disease without esophagitis, GERD (gastroesophageal reflux disease), Hx of diseases NEC, Hyperlipidemia, Insomnia, Left thyroid nodule, Low back pain, Malignant neoplasm of ovary ([address] Memory loss, Osteoporosis, Ovarian cancer ([address] Pain in joint, pelvic region and thigh, Paralysis of vocal cords or larynx, unspecified, Peripheral vascular disease, unspecified ([address] Personal history of malignant neoplasm of ovary, Scoliosis, Spondylolisthesis, Urinary incontinence, and Vocal cord dysfunction. She has a past surgical history that includes Colonoscopy (August 25, 2009); Facial cosmetic surgery (July 31, 2012); Facial cosmetic surgery (1999); Tonsillectomy; radha and bso (cervix removed) (1986); Abdominal exploration surgery (1986); Breast lumpectomy (2002); Breast reduction surgery (1981); Breast reconstruction; cystourethroscopy (February 27, 2013); Colonoscopy (01/18/2017); and Mastectomy, bilateral.  Her family history includes Cancer (age of onset: 50) in her daughter; Dementia (age of onset: 68) in her brother. She reports that she has quit smoking. Her smoking use included cigarettes. She has never used smokeless tobacco. She reports current alcohol use. She reports that she does not use drugs.     Medications     Current Discharge Medication List      CONTINUE these medications which have NOT CHANGED    Details   raloxifene (EVISTA) 60 MG tablet TAKE ONE TABLET BY MOUTH DAILY  Qty: 30 tablet, Refills: 1      carvedilol (COREG) 3.125 MG tablet TAKE ONE TABLET BY MOUTH TWICE DAILY  Qty: 60 tablet, Refills: 1    Associated Diagnoses: Essential radial pulses    Skin: No rashes or bruises    Neuro:     - Alert and oriented to person, place, time and situation  - Follows commands readily  - Normal speech with no aphasia and no dysarthria  - No facial asymmetry  - Visual fields intact bilaterally  - EOMI bilaterally, PERRLA. CN V motor intact. SILT in V1-V3 distribution. and sensory intact. CN VII intact: face symmetric evidenced by smile and forehead wrinkle. Hearing intact bilaterally and symmetric. Symmetric palate raise without uvular deviation. Symmetric shoulder shrug. SCM 5/5 bilaterally. Tongue is midline. - Symmetric 5/5 strength in the bilateral deltoids, biceps, triceps, wrist flexion/extension, , hip flexion, knee flex/ext, ankle dorsiflexion/plantarflexion.    - Sensation to soft touch intact in the upper and lower extremities bilaterally   - Finger-to-nose intact bilaterally, rapidly alternating movements intact bilaterally, no pronator drift,  - Patient has shuffling gait with instability bilaterally. Leans both directions and does not appear to favor one side. NIH 0, please see nursing documentation    Musculoskeletal: denies pain, recent fracture     Psych:   Mood and affect appropriate,     DiagnosticResults     EKG   Interpreted in conjunction with emergencydepartment physician Misti Maier MD  Rhythm: Normal sinus with 1st degree AV block  Rate: normal  Axis: normal  Ectopy: none  Conduction: 1st degree AV block  ST Segments: no acute change  T Waves: T wave inversion in V2, likely due to lead position  Q Waves: none  Clinical Impression: non-specific EKG  Comparison:  1st degree AV block is new from prior 9/2013    RADIOLOGY:  XR CHEST STANDARD (2 VW)   Final Result      Evidence of COPD/emphysema. No evidence of focal airspace disease. CT Head WO Contrast   Final Result      No acute intracranial abnormality.                 LABS:   Results for orders placed or performed during the hospital encounter of 06/23/20   CBC Glucose   Result Value Ref Range    POC Glucose 105 (H) 70 - 99 mg/dl    Performed on ACCU-CHEK      RECENT VITALS:  BP: (!) 166/69, Temp: 97.5 °F (36.4 °C), Pulse: 63,Resp: 18, SpO2: 99 %     ED Course     Nursing Notes, Past Medical Hx, Past Surgical Hx, Social Hx, Allergies, and Family Hx were reviewed. The patient was given the followingmedications:  Orders Placed This Encounter   Medications    vitamin C (ASCORBIC ACID) tablet 500 mg    aspirin EC tablet 81 mg    atorvastatin (LIPITOR) tablet 10 mg    calcium-vitamin D (OSCAL-500) 500-200 MG-UNIT per tablet 1 tablet    carvedilol (COREG) tablet 3.125 mg    vitamin D (CHOLECALCIFEROL) tablet 2,000 Units    cycloSPORINE (RESTASIS) 0.05 % ophthalmic emulsion 1 drop     Order Specific Question:   Please select a reason the therapeutic interchange was not accepted: Answer:   [facility]    lansoprazole (PREVACID) delayed release capsule 30 mg     Order Specific Question:   Please select a reason the therapeutic interchange was not accepted: Answer:   [facility]    raloxifene (EVISTA) tablet 60 mg    famotidine (PEPCID) tablet 40 mg    senna-docusate (PERICOLACE) 8.6-50 MG per tablet 1 tablet    solifenacin (VESICARE) tablet 10 mg     Order Specific Question:   Please select a reason the therapeutic interchange was not accepted:      Answer:   [facility]    sertraline (ZOLOFT) tablet 50 mg    vitamin B-12 (CYANOCOBALAMIN) tablet 500 mcg    sodium chloride flush 0.9 % injection 10 mL    sodium chloride flush 0.9 % injection 10 mL    OR Linked Order Group     acetaminophen (TYLENOL) tablet 650 mg     acetaminophen (TYLENOL) suppository 650 mg    polyethylene glycol (GLYCOLAX) packet 17 g    OR Linked Order Group     promethazine (PHENERGAN) tablet 12.5 mg     ondansetron (ZOFRAN) injection 4 mg    enoxaparin (LOVENOX) injection 40 mg    perflutren lipid microspheres

## 2020-06-23 NOTE — ED TRIAGE NOTES
Pt came in from the [facility] via squad, per pt she leaned over while sitting and bumped her head on the wall. Pt states she \"felt weird walking\". Pt states that this has happened before.

## 2020-06-23 NOTE — H&P
Hospital Medicine History & Physical      PCP: No primary care provider on file. Date of Admission: 6/23/2020    Date of Service: Pt seen/examined on 06/23/20    Placed in Observation. Chief Complaint:  LOC      History Of Present Illness:    [de-identified] y.o. female who presented to [facility] with cc of LOC. Patient was on floor patient denies any LOC. However per her  she might have blacked out. Per ER MD and nursing home report patient has a blackout episode. Patient denies any similar episode in the past.  Patient denies any chest pain, lightheadedness, palpitations, diaphoresis. She denies any trauma. She denies any headache, back pain, extremity pain.     Past Medical History:          Diagnosis Date    Anxiety     Aortic calcification ([address] 12/17/2012    Arteriosclerosis     Breast cancer ([address] 5/2/2013    Carcinoma in situ of breast     Cardiomegaly 12/5/2012    Carotid artery disease ([address] 8/3/2010    Cerebrovascular disease, unspecified     Chest pain 10/12/2010    COPD (chronic obstructive pulmonary disease) (HCC)     Coronary artery calcification seen on CT scan 12/5/2012    Decreased libido     Depression     Dysthymia 11/28/2016    Essential hypertension 11/25/2015    Gastroesophageal reflux disease without esophagitis 11/28/2016    GERD (gastroesophageal reflux disease) 11/24/2014    Hx of diseases NEC     Hyperlipidemia     Insomnia 9/21/2011    Left thyroid nodule 12/5/2012    Low back pain 6/26/2012    Malignant neoplasm of ovary (HCC)     Memory loss     Osteoporosis     Ovarian cancer ([address]     Pain in joint, pelvic region and thigh     Paralysis of vocal cords or larynx, unspecified     Peripheral vascular disease, unspecified ([address]     Personal history of malignant neoplasm of ovary     Scoliosis 12/17/2012    Spondylolisthesis 07/26/2012    L4-5 stenosis / Epidural Steroid injection    Urinary incontinence     Vocal cord dysfunction        Past calcium-vitamin D (OSCAL-500) 500-200 MG-UNIT per tablet Take 1 tablet by mouth daily. Historical Provider, MD   Multiple Vitamins-Minerals (CENTRUM SILVER PO) Take 1 tablet by mouth daily. 10/12/10   Historical Provider, MD   Sennosides-Docusate Sodium (SENNA S PO) Take 1 tablet by mouth daily. 10/12/10   Historical Provider, MD   aspirin 81 MG EC tablet Take 81 mg by mouth daily. Historical Provider, MD   Ascorbic Acid (VITAMIN C) 500 MG tablet Take 500 mg by mouth daily. Historical Provider, MD   Cholecalciferol (VITAMIN D) 1000 UNIT TABS Take 2,000 Units by mouth daily  8/3/10   Historical Provider, MD       Allergies:  Penicillins    Social History:      The patient currently lives at independent living. Her  is blind. TOBACCO:   reports that she has quit smoking. Her smoking use included cigarettes. She has never used smokeless tobacco.  ETOH:   reports current alcohol use. E-Cigarettes Vaping or Juuling     Questions Responses    Vaping Use     Start Date     Does device contain nicotine? Quit Date     Vaping Type             Family History:      Positive as follows:        Problem Relation Age of Onset    Cancer Daughter 50        transitional cell CA in the fallopian tube    Dementia Brother 68       REVIEW OF SYSTEMS:   Pertinent positives as noted in the HPI. All other systems reviewed and negative. PHYSICAL EXAM PERFORMED:    BP (!) 132/106   Pulse 75   Temp 98 °F (36.7 °C) (Oral)   Resp 17   SpO2 95%     General appearance:  No apparent distress, appears stated age and cooperative. HEENT:  Normal cephalic, atraumatic without obvious deformity. Pupils equal, round, and reactive to light. Extra ocular muscles intact. Conjunctivae/corneas clear. Neck: Supple, with full range of motion. No jugular venous distention. Trachea midline. Respiratory:  Normal respiratory effort. Clear to auscultation, bilaterally without Rales/Wheezes/Rhonchi.   Cardiovascular:  Regular rate  Syncope and collapse [R55] 06/23/2020         PLAN:    Admit to telemetry, observation. Check orthostatic vitals. Monitor on telemetry any rhythm disturbance. 2D echo negative  Neurology consultation. Continue home for chronic medical problems. PT OT      DVT Prophylaxis: Lovenox . Diet: No diet orders on file  Code Status: No Order    PT/OT Eval Status: Pending. Dispo - IP. Irma Byers MD    Thank you No primary care provider on file. for the opportunity to be involved in this patient's care. If you have any questions or concerns please feel free to contact me at [phone].

## 2020-06-23 NOTE — PLAN OF CARE
Problem: Falls - Risk of:  Goal: Will remain free from falls  Description: Will remain free from falls  Outcome: Ongoing  Note: Patient in bed,low position, alarm armed, non-skid footwear in place, call light within reach      Problem: HEMODYNAMIC STATUS  Goal: Patient has stable vital signs and fluid balance  Outcome: Ongoing

## 2020-06-24 PROBLEM — I95.1 ORTHOSTATIC HYPOTENSION: Status: ACTIVE | Noted: 2020-06-24

## 2020-06-24 LAB
SARS-COV-2, PCR: NOT DETECTED
TROPONIN: <0.01 NG/ML

## 2020-06-24 PROCEDURE — 6370000000 HC RX 637 (ALT 250 FOR IP): Performed by: INTERNAL MEDICINE

## 2020-06-24 PROCEDURE — G0378 HOSPITAL OBSERVATION PER HR: HCPCS

## 2020-06-24 PROCEDURE — 99223 1ST HOSP IP/OBS HIGH 75: CPT | Performed by: INTERNAL MEDICINE

## 2020-06-24 PROCEDURE — 2580000003 HC RX 258: Performed by: INTERNAL MEDICINE

## 2020-06-24 PROCEDURE — 6360000002 HC RX W HCPCS: Performed by: INTERNAL MEDICINE

## 2020-06-24 PROCEDURE — 96360 HYDRATION IV INFUSION INIT: CPT

## 2020-06-24 PROCEDURE — 1200000000 HC SEMI PRIVATE

## 2020-06-24 PROCEDURE — 96372 THER/PROPH/DIAG INJ SC/IM: CPT

## 2020-06-24 PROCEDURE — 96361 HYDRATE IV INFUSION ADD-ON: CPT

## 2020-06-24 RX ORDER — SODIUM CHLORIDE 9 MG/ML
INJECTION, SOLUTION INTRAVENOUS CONTINUOUS
Status: ACTIVE | OUTPATIENT
Start: 2020-06-24 | End: 2020-06-25

## 2020-06-24 RX ORDER — LORAZEPAM 0.5 MG/1
0.5 TABLET ORAL NIGHTLY PRN
Status: DISCONTINUED | OUTPATIENT
Start: 2020-06-24 | End: 2020-06-25 | Stop reason: HOSPADM

## 2020-06-24 RX ADMIN — MELATONIN 2000 UNITS: at 09:04

## 2020-06-24 RX ADMIN — Medication 1 TABLET: at 09:07

## 2020-06-24 RX ADMIN — MEMANTINE HYDROCHLORIDE 10 MG: 5 TABLET ORAL at 00:32

## 2020-06-24 RX ADMIN — ATORVASTATIN CALCIUM 10 MG: 10 TABLET, FILM COATED ORAL at 19:58

## 2020-06-24 RX ADMIN — POLYVINYL ALCOHOL 1 DROP: 14 SOLUTION/ DROPS OPHTHALMIC at 21:00

## 2020-06-24 RX ADMIN — LORAZEPAM 0.5 MG: 0.5 TABLET ORAL at 00:32

## 2020-06-24 RX ADMIN — SERTRALINE HYDROCHLORIDE 50 MG: 50 TABLET ORAL at 09:07

## 2020-06-24 RX ADMIN — POLYVINYL ALCOHOL 1 DROP: 14 SOLUTION/ DROPS OPHTHALMIC at 09:09

## 2020-06-24 RX ADMIN — SODIUM CHLORIDE: 9 INJECTION, SOLUTION INTRAVENOUS at 22:44

## 2020-06-24 RX ADMIN — SODIUM CHLORIDE: 9 INJECTION, SOLUTION INTRAVENOUS at 14:11

## 2020-06-24 RX ADMIN — CYANOCOBALAMIN TAB 1000 MCG 500 MCG: 1000 TAB at 09:05

## 2020-06-24 RX ADMIN — Medication 500 MG: at 09:06

## 2020-06-24 RX ADMIN — CARVEDILOL 3.12 MG: 3.12 TABLET, FILM COATED ORAL at 09:06

## 2020-06-24 RX ADMIN — ENOXAPARIN SODIUM 40 MG: 40 INJECTION SUBCUTANEOUS at 09:08

## 2020-06-24 RX ADMIN — CARVEDILOL 3.12 MG: 3.12 TABLET, FILM COATED ORAL at 18:49

## 2020-06-24 RX ADMIN — MEMANTINE HYDROCHLORIDE 10 MG: 5 TABLET ORAL at 09:06

## 2020-06-24 RX ADMIN — Medication 10 ML: at 09:08

## 2020-06-24 RX ADMIN — PANTOPRAZOLE SODIUM 40 MG: 40 TABLET, DELAYED RELEASE ORAL at 09:07

## 2020-06-24 RX ADMIN — ASPIRIN 81 MG: 81 TABLET, COATED ORAL at 09:08

## 2020-06-24 RX ADMIN — MEMANTINE HYDROCHLORIDE 10 MG: 5 TABLET ORAL at 19:58

## 2020-06-24 RX ADMIN — TROSPIUM CHLORIDE 20 MG: 20 TABLET, FILM COATED ORAL at 19:58

## 2020-06-24 RX ADMIN — TROSPIUM CHLORIDE 20 MG: 20 TABLET, FILM COATED ORAL at 09:08

## 2020-06-24 RX ADMIN — LORAZEPAM 0.5 MG: 0.5 TABLET ORAL at 21:00

## 2020-06-24 NOTE — CARE COORDINATION
Case Management Assessment           Initial Evaluation                Date / Time of Evaluation: 6/24/2020 1:05 PM                 Assessment Completed by: Blake Hanna    Patient Name: Kylah Henry     YOB: 1940  Diagnosis: Syncope and collapse [R55]  Syncope and collapse [R55]     Date / Time: 6/23/2020  1:02 PM    Patient Admission Status: Observation    If patient is discharged prior to next notation, then this note serves as note for discharge by case management. Current PCP: No primary care provider on file. Clinic Patient: No    Chart Reviewed: Yes  Patient/ Family Interviewed: Yes    Initial assessment completed at bedside with: patient over the phone    Hospitalization in the last 30 days: No    Emergency Contacts:  Extended Emergency Contact Information  Primary Emergency Contact: Dioni Rizzo  Address: [address] [place] [address] Phone: [phone]  Work Phone: [phone]  Relation: Spouse    Advance Directives:   Code Status: [address]: Yes      Financial  Payor: Joshua Mansfield / Plan: MEDICARE PART A AND B / Product Type: *No Product type* /     Pre-cert required for SNF: No    Pharmacy    [address] - [address] [phone]  [address] [place] [address]. [address] [facility] [address]  Phone: [phone] Fax: [phone]      Potential assistance Purchasing Medications:    Does Patient want to participate in local refill/ meds to beds program?:      Meds To Beds General Rules:  1. Can ONLY be done Monday- Friday between 8:30am-5pm  2. Prescription(s) must be in pharmacy by 3pm to be filled same day  3. Copy of patient's insurance/ prescription drug card and patient face sheet must be sent along with the prescription(s)  4. Cost of Rx cannot be added to hospital bill. If financial assistance is needed, please contact unit  or ;   providers.  Yes    Care Transition patient: No    Kaden Pandey RN  [facility]  Case Management Department  Ph: [phone]   Fax: [phone]

## 2020-06-25 VITALS
HEART RATE: 77 BPM | WEIGHT: 128.31 LBS | HEIGHT: 63 IN | BODY MASS INDEX: 22.73 KG/M2 | SYSTOLIC BLOOD PRESSURE: 130 MMHG | OXYGEN SATURATION: 94 % | TEMPERATURE: 97.6 F | RESPIRATION RATE: 16 BRPM | DIASTOLIC BLOOD PRESSURE: 69 MMHG

## 2020-06-25 LAB
EKG ATRIAL RATE: 67 BPM
EKG DIAGNOSIS: NORMAL
EKG P AXIS: 65 DEGREES
EKG P-R INTERVAL: 216 MS
EKG Q-T INTERVAL: 418 MS
EKG QRS DURATION: 74 MS
EKG QTC CALCULATION (BAZETT): 441 MS
EKG R AXIS: 0 DEGREES
EKG T AXIS: 43 DEGREES
EKG VENTRICULAR RATE: 67 BPM
LV EF: 53 %
LVEF MODALITY: NORMAL

## 2020-06-25 PROCEDURE — 96361 HYDRATE IV INFUSION ADD-ON: CPT

## 2020-06-25 PROCEDURE — 6360000002 HC RX W HCPCS: Performed by: INTERNAL MEDICINE

## 2020-06-25 PROCEDURE — 93306 TTE W/DOPPLER COMPLETE: CPT

## 2020-06-25 PROCEDURE — 6370000000 HC RX 637 (ALT 250 FOR IP): Performed by: INTERNAL MEDICINE

## 2020-06-25 PROCEDURE — 96372 THER/PROPH/DIAG INJ SC/IM: CPT

## 2020-06-25 PROCEDURE — 2580000003 HC RX 258: Performed by: INTERNAL MEDICINE

## 2020-06-25 PROCEDURE — 99232 SBSQ HOSP IP/OBS MODERATE 35: CPT | Performed by: INTERNAL MEDICINE

## 2020-06-25 RX ADMIN — Medication 1 TABLET: at 08:03

## 2020-06-25 RX ADMIN — ASPIRIN 81 MG: 81 TABLET, COATED ORAL at 08:02

## 2020-06-25 RX ADMIN — CYANOCOBALAMIN TAB 1000 MCG 500 MCG: 1000 TAB at 08:02

## 2020-06-25 RX ADMIN — PANTOPRAZOLE SODIUM 40 MG: 40 TABLET, DELAYED RELEASE ORAL at 08:02

## 2020-06-25 RX ADMIN — MELATONIN 2000 UNITS: at 08:03

## 2020-06-25 RX ADMIN — Medication 10 ML: at 08:04

## 2020-06-25 RX ADMIN — TROSPIUM CHLORIDE 20 MG: 20 TABLET, FILM COATED ORAL at 08:02

## 2020-06-25 RX ADMIN — ENOXAPARIN SODIUM 40 MG: 40 INJECTION SUBCUTANEOUS at 08:03

## 2020-06-25 RX ADMIN — POLYVINYL ALCOHOL 1 DROP: 14 SOLUTION/ DROPS OPHTHALMIC at 08:04

## 2020-06-25 RX ADMIN — Medication 500 MG: at 08:03

## 2020-06-25 RX ADMIN — CARVEDILOL 3.12 MG: 3.12 TABLET, FILM COATED ORAL at 08:03

## 2020-06-25 RX ADMIN — SERTRALINE HYDROCHLORIDE 50 MG: 50 TABLET ORAL at 08:03

## 2020-06-25 RX ADMIN — MEMANTINE HYDROCHLORIDE 10 MG: 5 TABLET ORAL at 08:02

## 2020-06-25 ASSESSMENT — PAIN SCALES - GENERAL
PAINLEVEL_OUTOF10: 0

## 2020-06-25 ASSESSMENT — PAIN SCALES - PAIN ASSESSMENT IN ADVANCED DEMENTIA (PAINAD)
FACIALEXPRESSION: 0
BODYLANGUAGE: 0
CONSOLABILITY: 0
BREATHING: 0
TOTALSCORE: 0
NEGVOCALIZATION: 0

## 2020-06-25 NOTE — DISCHARGE SUMMARY
without Rales/Wheezes/Rhonchi. Cardiovascular:  Regular rate and rhythm with normal S1/S2 without murmurs, rubs or gallops. Abdomen: Soft, non-tender, non-distended with normal bowel sounds. Musculoskeletal:  No clubbing, cyanosis or edema bilaterally. Full range of motion without deformity. Skin: Skin color, texture, turgor normal.  No rashes or lesions. Neurologic:  Neurovascularly intact without any focal sensory/motor deficits. Cranial nerves: II-XII intact, grossly non-focal.  Psychiatric:  Alert and oriented, thought content appropriate, normal insight  Capillary Refill: Brisk,< 3 seconds   Peripheral Pulses: +2 palpable, equal bilaterally       Labs: For convenience and continuity at follow-up the following most recent labs are provided:      CBC:    Lab Results   Component Value Date    WBC 4.0 06/23/2020    HGB 14.3 06/23/2020    HCT 44.0 06/23/2020     06/23/2020       Renal:    Lab Results   Component Value Date     06/23/2020    K 4.7 06/23/2020     06/23/2020    CO2 22 06/23/2020    BUN 21 06/23/2020    CREATININE 0.9 06/23/2020    CALCIUM 9.3 06/23/2020    PHOS 4.3 03/19/2018         Significant Diagnostic Studies    Radiology:   XR CHEST STANDARD (2 VW)   Final Result      Evidence of COPD/emphysema. No evidence of focal airspace disease. CT Head WO Contrast   Final Result      No acute intracranial abnormality.                    Consults:     IP CONSULT TO PRIMARY CARE PROVIDER  IP CONSULT TO PRIMARY CARE PROVIDER  IP CONSULT TO HOSPITALIST  IP CONSULT TO PHARMACY  IP CONSULT TO CARDIOLOGY    Disposition:  Home      Condition at Discharge: Stable    Discharge Instructions/Follow-up:  Home     Code Status:  Full Code     Activity: activity as tolerated          Discharge Medications:     Current Discharge Medication List           Details   memantine (NAMENDA) 10 MG tablet Take 10 mg by mouth 2 times daily      temazepam (RESTORIL) 30 MG capsule Take 30 mg by mouth

## 2020-06-25 NOTE — PROGRESS NOTES
[facility]   Cardiac Electrophysiology Progress Note     Admit Date: 2020     Reason for follow up: Syncope    HPI and Interval History:   Patient seen and examined. Clinical notes reviewed. Telemetry reviewed. Normal sinus rhythm  Patient denies loss of consciousness during the episode  She says she was just weak and swaying towards the left side not able to maintain the balance  No major events overnight. Denies having chest pain, shortness of breath, dyspnea on exertion, Orthopnea, PND at the time of this visit. Review of System:  All other systems reviewed except for that noted above. Pertinent negatives and positives are:     · General: negative for fever, chills   · Ophthalmic ROS: negative for - eye pain or loss of vision  · ENT ROS: negative for - headaches, sore throat   · Respiratory: negative for - cough, sputum  · Cardiovascular: Reviewed in HPI  · Gastrointestinal: negative for - abdominal pain, diarrhea, N/V  · Hematology: negative for - bleeding, blood clots, bruising or jaundice  · Genito-Urinary:  negative for - Dysuria or incontinence  · Musculoskeletal: negative for - Joint swelling, muscle pain  · Neurological: negative for - confusion, dizziness, headaches   · Psychiatric: No anxiety, no depression. · Dermatological: negative for - rash      Physical Examination:  Vitals:    20 0410   BP: 137/88   Pulse: 85   Resp: 16   Temp: 98.3 °F (36.8 °C)   SpO2: 95%        Intake/Output Summary (Last 24 hours) at 2020 0739  Last data filed at 2020 0435  Gross per 24 hour   Intake 1515 ml   Output --   Net 1515 ml     In: 9244 [P.O.:420;  I.V.:855]  Out: -    Wt Readings from Last 3 Encounters:   20 128 lb 4.9 oz (58.2 kg)   18 133 lb (60.3 kg)   10/25/17 130 lb 6.4 oz (59.1 kg)     Temp  Av.9 °F (36.6 °C)  Min: 97 °F (36.1 °C)  Max: 98.9 °F (37.2 °C)  Pulse  Av.9  Min: 73  Max: 89  BP  Min: 121/71  Max: 165/78  SpO2  Av.7 %  Min: 93 % Max: 97 %    · Telemetry: Sinus rhythm   · Constitutional: Alert. Oriented to person, place, and time. No distress. · Head: Normocephalic and atraumatic. · Mouth/Throat: Lips appear moist. Oropharynx is clear and moist.  · Eyes: Conjunctivae normal. EOM are normal.   · Neck: Neck supple. No lymphadenopathy. No rigidity. No JVD present. · Cardiovascular: Normal rate, regular rhythm. Normal S1&S2. Carotid pulse 2+ bilaterally. · Pulmonary/Chest: Bilateral respiratory sounds present. No respiratory accessory muscle use. No wheezes, No rhonchi. · Abdominal: Soft. Normal bowel sounds present. No distension, No tenderness. No splenomegaly. No hernia. · Musculoskeletal: No tenderness. No edema    · Lymphadenopathy: Has no cervical adenopathy. · Neurological: Alert and oriented. Cranial nerve II-XII grossly intact, No gross deficit to touch. · Skin: Skin is warm and dry. No rash, lesions, ulcerations noted. · Psychiatric: No anxiety nor agitation. Labs, diagnostic and imaging results reviewed. Reviewed. Recent Labs     06/23/20  1334   *   K 4.7      CO2 22   BUN 21*   CREATININE 0.9     Recent Labs     06/23/20  1334   WBC 4.0   HGB 14.3   HCT 44.0   MCV 92.3   *     Lab Results   Component Value Date    TROPONINI <0.01 06/23/2020     Estimated Creatinine Clearance: 41 mL/min (based on SCr of 0.9 mg/dL).    No results found for: BNP  Lab Results   Component Value Date    PROTIME 11.7 06/23/2020    INR 1.01 06/23/2020     Lab Results   Component Value Date    CHOL 204 11/30/2016    HDL 87 12/02/2017    HDL 92 08/05/2010    TRIG 76 11/30/2016       Scheduled Meds:   vitamin C  500 mg Oral Daily    aspirin  81 mg Oral Daily    atorvastatin  10 mg Oral Nightly    calcium-vitamin D  1 tablet Oral Daily    carvedilol  3.125 mg Oral BID WC    Vitamin D  2,000 Units Oral Daily    polyvinyl alcohol  1 drop Both Eyes BID    pantoprazole  40 mg Oral Daily    trospium  20 mg Oral

## 2020-06-25 NOTE — CONSULTS
PHARMACY CONSULT NOTE    Patient is a [de-identified] yof admitted for observation related to recent syncope/fall. Of note patient has a history of dementia. Pharmacy has been consulted by Dr. Waldemar Lee to review patient home medications. The list of home medications the patient is currently taking is complete. Source of medications in list is outpatient prescription refill history with follow up questioning to RN/patient. Please note the following. The following medications were added to this list: Temazepam and memanatine. Aspirin, ranitidine, ibuprofen, and senna being OTC medications could not be confirmed by prescription refill. Patient states she takes aspirin. Ranitidine has recently been removed from the market. Given patient is also on Lansoprazole, it is not likely patient is taking both ranitidine and lansoprazole. Famotidine order (as interchange for ranitidine) has been discontinued. Ibuprofen is not ordered in hospital. Senna prn order has been verified. Raloxifene order was confirmed by outpatient prescription refills, but order was discontinued in the hospital due to non-formulary status in hospital given increased risk of VTE. Cyclosporine eye drops could not be confirmed by prescription refill history, but this interchanges to artifical tears in the hospital and this has been verified. Thanks for consulting pharmacy and feel free follow up with any questions.   Talbert Halsted PharmD, [facility] 6/23/2020 10:53 PM  [phone]
from 2012 showed normal LV ejection fraction. During the initial hospital stay, she was noted to have hypertension. Hence, it is reasonable to continue her current dose of Coreg. Pending transthoracic echocardiogram.      Continue to monitor telemetry. Thank you for allowing me to participate in the care of James Muñoz . If you have any questions/comments, please do not hesitate to contact us.       Whitney Engel MD   Cardiac Electrophysiology  [facility]

## 2021-05-20 ENCOUNTER — APPOINTMENT (OUTPATIENT)
Dept: GENERAL RADIOLOGY | Age: 81
DRG: 690 | End: 2021-05-20
Payer: MEDICARE

## 2021-05-20 ENCOUNTER — HOSPITAL ENCOUNTER (INPATIENT)
Age: 81
LOS: 1 days | Discharge: HOME HEALTH | DRG: 690 | End: 2021-05-23
Attending: EMERGENCY MEDICINE | Admitting: INTERNAL MEDICINE
Payer: MEDICARE

## 2021-05-20 ENCOUNTER — APPOINTMENT (OUTPATIENT)
Dept: CT IMAGING | Age: 81
DRG: 690 | End: 2021-05-20
Payer: MEDICARE

## 2021-05-20 DIAGNOSIS — N39.0 URINARY TRACT INFECTION WITHOUT HEMATURIA, SITE UNSPECIFIED: ICD-10-CM

## 2021-05-20 DIAGNOSIS — D72.825 BANDEMIA: ICD-10-CM

## 2021-05-20 DIAGNOSIS — R53.1 GENERALIZED WEAKNESS: Primary | ICD-10-CM

## 2021-05-20 LAB
ALBUMIN SERPL-MCNC: 3.3 G/DL (ref 3.4–5)
ALP BLD-CCNC: 57 U/L (ref 40–129)
ALT SERPL-CCNC: 21 U/L (ref 10–40)
ANION GAP SERPL CALCULATED.3IONS-SCNC: 11 MMOL/L (ref 3–16)
AST SERPL-CCNC: 40 U/L (ref 15–37)
BACTERIA: ABNORMAL /HPF
BANDED NEUTROPHILS RELATIVE PERCENT: 4 % (ref 0–7)
BASOPHILS ABSOLUTE: 0 K/UL (ref 0–0.2)
BASOPHILS RELATIVE PERCENT: 0 %
BILIRUB SERPL-MCNC: 0.3 MG/DL (ref 0–1)
BILIRUBIN DIRECT: <0.2 MG/DL (ref 0–0.3)
BILIRUBIN URINE: NEGATIVE
BILIRUBIN, INDIRECT: ABNORMAL MG/DL (ref 0–1)
BLOOD, URINE: ABNORMAL
BUN BLDV-MCNC: 18 MG/DL (ref 7–20)
CALCIUM SERPL-MCNC: 8.1 MG/DL (ref 8.3–10.6)
CHLORIDE BLD-SCNC: 104 MMOL/L (ref 99–110)
CLARITY: ABNORMAL
CO2: 22 MMOL/L (ref 21–32)
COLOR: YELLOW
CREAT SERPL-MCNC: 0.9 MG/DL (ref 0.6–1.2)
EKG ATRIAL RATE: 67 BPM
EKG ATRIAL RATE: 70 BPM
EKG DIAGNOSIS: NORMAL
EKG DIAGNOSIS: NORMAL
EKG P AXIS: 58 DEGREES
EKG P AXIS: 70 DEGREES
EKG P-R INTERVAL: 198 MS
EKG P-R INTERVAL: 198 MS
EKG Q-T INTERVAL: 398 MS
EKG Q-T INTERVAL: 402 MS
EKG QRS DURATION: 72 MS
EKG QRS DURATION: 80 MS
EKG QTC CALCULATION (BAZETT): 420 MS
EKG QTC CALCULATION (BAZETT): 434 MS
EKG R AXIS: 26 DEGREES
EKG R AXIS: 28 DEGREES
EKG T AXIS: 43 DEGREES
EKG T AXIS: 49 DEGREES
EKG VENTRICULAR RATE: 67 BPM
EKG VENTRICULAR RATE: 70 BPM
EOSINOPHILS ABSOLUTE: 0.2 K/UL (ref 0–0.6)
EOSINOPHILS RELATIVE PERCENT: 8 %
EPITHELIAL CELLS, UA: ABNORMAL /HPF (ref 0–5)
GFR AFRICAN AMERICAN: >60
GFR NON-AFRICAN AMERICAN: 60
GLUCOSE BLD-MCNC: 90 MG/DL (ref 70–99)
GLUCOSE BLD-MCNC: 91 MG/DL (ref 70–99)
GLUCOSE URINE: NEGATIVE MG/DL
HCT VFR BLD CALC: 44.7 % (ref 36–48)
HEMOGLOBIN: 14.8 G/DL (ref 12–16)
KETONES, URINE: 15 MG/DL
LEUKOCYTE ESTERASE, URINE: ABNORMAL
LIPASE: 35 U/L (ref 13–60)
LYMPHOCYTES ABSOLUTE: 0.7 K/UL (ref 1–5.1)
LYMPHOCYTES RELATIVE PERCENT: 24 %
MCH RBC QN AUTO: 30.4 PG (ref 26–34)
MCHC RBC AUTO-ENTMCNC: 33.1 G/DL (ref 31–36)
MCV RBC AUTO: 92 FL (ref 80–100)
MICROSCOPIC EXAMINATION: YES
MONOCYTES ABSOLUTE: 0 K/UL (ref 0–1.3)
MONOCYTES RELATIVE PERCENT: 0 %
NEUTROPHILS ABSOLUTE: 1.9 K/UL (ref 1.7–7.7)
NEUTROPHILS RELATIVE PERCENT: 64 %
NITRITE, URINE: NEGATIVE
PDW BLD-RTO: 14.4 % (ref 12.4–15.4)
PERFORMED ON: NORMAL
PH UA: 6 (ref 5–8)
PLATELET # BLD: 69 K/UL (ref 135–450)
PLATELET SLIDE REVIEW: ABNORMAL
PMV BLD AUTO: 10.6 FL (ref 5–10.5)
POTASSIUM REFLEX MAGNESIUM: 4.8 MMOL/L (ref 3.5–5.1)
PRO-BNP: 529 PG/ML (ref 0–449)
PROTEIN UA: 100 MG/DL
RBC # BLD: 4.86 M/UL (ref 4–5.2)
RBC # BLD: NORMAL 10*6/UL
RBC UA: ABNORMAL /HPF (ref 0–4)
SLIDE REVIEW: ABNORMAL
SODIUM BLD-SCNC: 137 MMOL/L (ref 136–145)
SPECIFIC GRAVITY UA: >=1.03 (ref 1–1.03)
TOTAL PROTEIN: 6.1 G/DL (ref 6.4–8.2)
TROPONIN: <0.01 NG/ML
URINE TYPE: ABNORMAL
UROBILINOGEN, URINE: 1 E.U./DL
VACUOLATED NEUTROPHILS: PRESENT
WBC # BLD: 2.8 K/UL (ref 4–11)
WBC UA: ABNORMAL /HPF (ref 0–5)

## 2021-05-20 PROCEDURE — G0378 HOSPITAL OBSERVATION PER HR: HCPCS

## 2021-05-20 PROCEDURE — 93005 ELECTROCARDIOGRAM TRACING: CPT | Performed by: STUDENT IN AN ORGANIZED HEALTH CARE EDUCATION/TRAINING PROGRAM

## 2021-05-20 PROCEDURE — 2580000003 HC RX 258: Performed by: INTERNAL MEDICINE

## 2021-05-20 PROCEDURE — 6360000004 HC RX CONTRAST MEDICATION: Performed by: STUDENT IN AN ORGANIZED HEALTH CARE EDUCATION/TRAINING PROGRAM

## 2021-05-20 PROCEDURE — 83690 ASSAY OF LIPASE: CPT

## 2021-05-20 PROCEDURE — 87040 BLOOD CULTURE FOR BACTERIA: CPT

## 2021-05-20 PROCEDURE — 81001 URINALYSIS AUTO W/SCOPE: CPT

## 2021-05-20 PROCEDURE — 6370000000 HC RX 637 (ALT 250 FOR IP): Performed by: INTERNAL MEDICINE

## 2021-05-20 PROCEDURE — 85025 COMPLETE CBC W/AUTO DIFF WBC: CPT

## 2021-05-20 PROCEDURE — 87077 CULTURE AEROBIC IDENTIFY: CPT

## 2021-05-20 PROCEDURE — 80048 BASIC METABOLIC PNL TOTAL CA: CPT

## 2021-05-20 PROCEDURE — 96372 THER/PROPH/DIAG INJ SC/IM: CPT

## 2021-05-20 PROCEDURE — 93005 ELECTROCARDIOGRAM TRACING: CPT | Performed by: EMERGENCY MEDICINE

## 2021-05-20 PROCEDURE — 80076 HEPATIC FUNCTION PANEL: CPT

## 2021-05-20 PROCEDURE — 6360000002 HC RX W HCPCS: Performed by: STUDENT IN AN ORGANIZED HEALTH CARE EDUCATION/TRAINING PROGRAM

## 2021-05-20 PROCEDURE — 71260 CT THORAX DX C+: CPT

## 2021-05-20 PROCEDURE — 87086 URINE CULTURE/COLONY COUNT: CPT

## 2021-05-20 PROCEDURE — 84484 ASSAY OF TROPONIN QUANT: CPT

## 2021-05-20 PROCEDURE — 87186 SC STD MICRODIL/AGAR DIL: CPT

## 2021-05-20 PROCEDURE — 6360000002 HC RX W HCPCS: Performed by: INTERNAL MEDICINE

## 2021-05-20 PROCEDURE — 99284 EMERGENCY DEPT VISIT MOD MDM: CPT

## 2021-05-20 PROCEDURE — 96365 THER/PROPH/DIAG IV INF INIT: CPT

## 2021-05-20 PROCEDURE — 36415 COLL VENOUS BLD VENIPUNCTURE: CPT

## 2021-05-20 PROCEDURE — 2580000003 HC RX 258: Performed by: STUDENT IN AN ORGANIZED HEALTH CARE EDUCATION/TRAINING PROGRAM

## 2021-05-20 PROCEDURE — 83880 ASSAY OF NATRIURETIC PEPTIDE: CPT

## 2021-05-20 PROCEDURE — 71045 X-RAY EXAM CHEST 1 VIEW: CPT

## 2021-05-20 RX ORDER — CARVEDILOL 3.12 MG/1
3.12 TABLET ORAL 2 TIMES DAILY WITH MEALS
Status: DISCONTINUED | OUTPATIENT
Start: 2021-05-20 | End: 2021-05-23 | Stop reason: HOSPADM

## 2021-05-20 RX ORDER — PROMETHAZINE HYDROCHLORIDE 25 MG/1
12.5 TABLET ORAL EVERY 6 HOURS PRN
Status: DISCONTINUED | OUTPATIENT
Start: 2021-05-20 | End: 2021-05-23 | Stop reason: HOSPADM

## 2021-05-20 RX ORDER — LORAZEPAM 0.5 MG/1
0.5 TABLET ORAL NIGHTLY PRN
Status: DISCONTINUED | OUTPATIENT
Start: 2021-05-20 | End: 2021-05-23 | Stop reason: HOSPADM

## 2021-05-20 RX ORDER — DEXTRAN, HYPROMELLOSE 2910 .001; .003 ML/ML; ML/ML
1 SOLUTION OPHTHALMIC PRN
COMMUNITY

## 2021-05-20 RX ORDER — DONEPEZIL HYDROCHLORIDE 10 MG/1
10 TABLET, FILM COATED ORAL NIGHTLY
COMMUNITY

## 2021-05-20 RX ORDER — ASCORBIC ACID 500 MG
500 TABLET ORAL DAILY
Status: DISCONTINUED | OUTPATIENT
Start: 2021-05-20 | End: 2021-05-23 | Stop reason: HOSPADM

## 2021-05-20 RX ORDER — ONDANSETRON 2 MG/ML
4 INJECTION INTRAMUSCULAR; INTRAVENOUS EVERY 6 HOURS PRN
Status: DISCONTINUED | OUTPATIENT
Start: 2021-05-20 | End: 2021-05-23 | Stop reason: HOSPADM

## 2021-05-20 RX ORDER — PANTOPRAZOLE SODIUM 20 MG/1
20 TABLET, DELAYED RELEASE ORAL
Status: DISCONTINUED | OUTPATIENT
Start: 2021-05-21 | End: 2021-05-23 | Stop reason: HOSPADM

## 2021-05-20 RX ORDER — TROSPIUM CHLORIDE 20 MG/1
20 TABLET, FILM COATED ORAL
Status: DISCONTINUED | OUTPATIENT
Start: 2021-05-21 | End: 2021-05-23 | Stop reason: HOSPADM

## 2021-05-20 RX ORDER — SOLIFENACIN SUCCINATE 10 MG/1
5 TABLET, FILM COATED ORAL NIGHTLY
COMMUNITY

## 2021-05-20 RX ORDER — SODIUM CHLORIDE, SODIUM LACTATE, POTASSIUM CHLORIDE, CALCIUM CHLORIDE 600; 310; 30; 20 MG/100ML; MG/100ML; MG/100ML; MG/100ML
1000 INJECTION, SOLUTION INTRAVENOUS ONCE
Status: COMPLETED | OUTPATIENT
Start: 2021-05-20 | End: 2021-05-20

## 2021-05-20 RX ORDER — CARVEDILOL 3.12 MG/1
3.12 TABLET ORAL 2 TIMES DAILY WITH MEALS
COMMUNITY

## 2021-05-20 RX ORDER — RALOXIFENE HYDROCHLORIDE 60 MG/1
60 TABLET, FILM COATED ORAL DAILY
COMMUNITY

## 2021-05-20 RX ORDER — MEMANTINE HYDROCHLORIDE 10 MG/1
10 TABLET ORAL 2 TIMES DAILY
Status: DISCONTINUED | OUTPATIENT
Start: 2021-05-20 | End: 2021-05-23 | Stop reason: HOSPADM

## 2021-05-20 RX ORDER — ACETAMINOPHEN 325 MG/1
650 TABLET ORAL EVERY 6 HOURS PRN
Status: DISCONTINUED | OUTPATIENT
Start: 2021-05-20 | End: 2021-05-23 | Stop reason: HOSPADM

## 2021-05-20 RX ORDER — ASPIRIN 81 MG/1
81 TABLET ORAL DAILY
Status: DISCONTINUED | OUTPATIENT
Start: 2021-05-20 | End: 2021-05-23 | Stop reason: HOSPADM

## 2021-05-20 RX ORDER — DONEPEZIL HYDROCHLORIDE 10 MG/1
10 TABLET, FILM COATED ORAL NIGHTLY
Status: DISCONTINUED | OUTPATIENT
Start: 2021-05-20 | End: 2021-05-23 | Stop reason: HOSPADM

## 2021-05-20 RX ORDER — SODIUM CHLORIDE 9 MG/ML
25 INJECTION, SOLUTION INTRAVENOUS PRN
Status: DISCONTINUED | OUTPATIENT
Start: 2021-05-20 | End: 2021-05-23 | Stop reason: HOSPADM

## 2021-05-20 RX ORDER — SODIUM CHLORIDE 0.9 % (FLUSH) 0.9 %
5-40 SYRINGE (ML) INJECTION EVERY 12 HOURS SCHEDULED
Status: DISCONTINUED | OUTPATIENT
Start: 2021-05-20 | End: 2021-05-23 | Stop reason: HOSPADM

## 2021-05-20 RX ORDER — RALOXIFENE HYDROCHLORIDE 60 MG/1
60 TABLET, FILM COATED ORAL DAILY
Status: DISCONTINUED | OUTPATIENT
Start: 2021-05-20 | End: 2021-05-23 | Stop reason: HOSPADM

## 2021-05-20 RX ORDER — B-COMPLEX WITH VITAMIN C
1 TABLET ORAL DAILY
Status: DISCONTINUED | OUTPATIENT
Start: 2021-05-20 | End: 2021-05-23 | Stop reason: HOSPADM

## 2021-05-20 RX ORDER — POLYVINYL ALCOHOL 14 MG/ML
1 SOLUTION/ DROPS OPHTHALMIC PRN
Status: DISCONTINUED | OUTPATIENT
Start: 2021-05-20 | End: 2021-05-23 | Stop reason: HOSPADM

## 2021-05-20 RX ORDER — VITAMIN B COMPLEX
2000 TABLET ORAL DAILY
Status: DISCONTINUED | OUTPATIENT
Start: 2021-05-20 | End: 2021-05-23 | Stop reason: HOSPADM

## 2021-05-20 RX ORDER — ACETAMINOPHEN 650 MG/1
650 SUPPOSITORY RECTAL EVERY 6 HOURS PRN
Status: DISCONTINUED | OUTPATIENT
Start: 2021-05-20 | End: 2021-05-23 | Stop reason: HOSPADM

## 2021-05-20 RX ORDER — LANOLIN ALCOHOL/MO/W.PET/CERES
1000 CREAM (GRAM) TOPICAL DAILY
Status: DISCONTINUED | OUTPATIENT
Start: 2021-05-20 | End: 2021-05-23 | Stop reason: HOSPADM

## 2021-05-20 RX ORDER — SODIUM CHLORIDE 0.9 % (FLUSH) 0.9 %
5-40 SYRINGE (ML) INJECTION PRN
Status: DISCONTINUED | OUTPATIENT
Start: 2021-05-20 | End: 2021-05-23 | Stop reason: HOSPADM

## 2021-05-20 RX ORDER — LANSOPRAZOLE 30 MG/1
30 CAPSULE, DELAYED RELEASE ORAL DAILY
COMMUNITY

## 2021-05-20 RX ORDER — POLYETHYLENE GLYCOL 3350 17 G/17G
17 POWDER, FOR SOLUTION ORAL DAILY PRN
Status: DISCONTINUED | OUTPATIENT
Start: 2021-05-20 | End: 2021-05-23 | Stop reason: HOSPADM

## 2021-05-20 RX ADMIN — Medication 2000 UNITS: at 18:30

## 2021-05-20 RX ADMIN — CARVEDILOL 3.12 MG: 3.12 TABLET, FILM COATED ORAL at 18:30

## 2021-05-20 RX ADMIN — IOPAMIDOL 80 ML: 755 INJECTION, SOLUTION INTRAVENOUS at 13:39

## 2021-05-20 RX ADMIN — ASPIRIN 81 MG: 81 TABLET, COATED ORAL at 18:30

## 2021-05-20 RX ADMIN — SERTRALINE HYDROCHLORIDE 50 MG: 50 TABLET ORAL at 20:16

## 2021-05-20 RX ADMIN — CEFTRIAXONE 1000 MG: 1 INJECTION, POWDER, FOR SOLUTION INTRAMUSCULAR; INTRAVENOUS at 15:33

## 2021-05-20 RX ADMIN — CYANOCOBALAMIN TAB 1000 MCG 1000 MCG: 1000 TAB at 18:30

## 2021-05-20 RX ADMIN — ACETAMINOPHEN 650 MG: 325 TABLET ORAL at 20:27

## 2021-05-20 RX ADMIN — DONEPEZIL HYDROCHLORIDE 10 MG: 10 TABLET, FILM COATED ORAL at 20:16

## 2021-05-20 RX ADMIN — ENOXAPARIN SODIUM 40 MG: 40 INJECTION SUBCUTANEOUS at 20:16

## 2021-05-20 RX ADMIN — MEMANTINE HYDROCHLORIDE 10 MG: 10 TABLET ORAL at 20:16

## 2021-05-20 RX ADMIN — Medication 5 ML: at 20:17

## 2021-05-20 RX ADMIN — SODIUM CHLORIDE, SODIUM LACTATE, POTASSIUM CHLORIDE, AND CALCIUM CHLORIDE 1000 ML: .6; .31; .03; .02 INJECTION, SOLUTION INTRAVENOUS at 16:54

## 2021-05-20 RX ADMIN — CALCIUM CARBONATE-VITAMIN D TAB 500 MG-200 UNIT 1 TABLET: 500-200 TAB at 18:30

## 2021-05-20 RX ADMIN — OXYCODONE HYDROCHLORIDE AND ACETAMINOPHEN 500 MG: 500 TABLET ORAL at 18:30

## 2021-05-20 ASSESSMENT — PAIN SCALES - GENERAL
PAINLEVEL_OUTOF10: 3
PAINLEVEL_OUTOF10: 3
PAINLEVEL_OUTOF10: 0
PAINLEVEL_OUTOF10: 0

## 2021-05-20 ASSESSMENT — ENCOUNTER SYMPTOMS
EYE PAIN: 0
SHORTNESS OF BREATH: 0
NAUSEA: 0
ABDOMINAL PAIN: 0
WHEEZING: 0
CHEST TIGHTNESS: 0
COUGH: 0
BLOOD IN STOOL: 0
CONSTIPATION: 0
DIARRHEA: 0
VOMITING: 0

## 2021-05-20 ASSESSMENT — PAIN DESCRIPTION - DESCRIPTORS
DESCRIPTORS: ACHING;SORE
DESCRIPTORS: ACHING

## 2021-05-20 ASSESSMENT — PAIN DESCRIPTION - PAIN TYPE: TYPE: ACUTE PAIN

## 2021-05-20 ASSESSMENT — PAIN DESCRIPTION - FREQUENCY: FREQUENCY: CONTINUOUS

## 2021-05-20 ASSESSMENT — PAIN DESCRIPTION - ORIENTATION: ORIENTATION: MID

## 2021-05-20 ASSESSMENT — PAIN DESCRIPTION - LOCATION: LOCATION: HEAD

## 2021-05-20 NOTE — PROGRESS NOTES
Admission Progress Note  5/20/2021 5:48 PM     Data: Patient to room 5305. See doc flow sheets for assessments and screenings. Action: Patient oriented to room and call light. Patient informed of plan of care. Reviewed the patient education folder with the patient and/or family. Patient instructed to call nurse with any needs or concerns. Response:  Patient verbalized understanding of plan of care and all instructions. Bed locked and in lowest position. Call light in reach. Will continue to monitor patient per plan of care.     Sima Devries RN  ________________________________________________________________________

## 2021-05-20 NOTE — ED PROVIDER NOTES
[address] [facility] RESIDENT NOTE       Date of evaluation: 5/20/2021    Chief Complaint     Fatigue (recently dx with UTI, since has felt weak and generalized pain)      of Present Illness     Carmelina Anderson is a 79 y.o. female carotid artery disease, COPD, depression and Alzheimer's who presents to the emergency department for generalized weakness. Patient states that she was diagnosed with a urinary tract infection 2 weeks ago after she had discomfort while urinating. She thinks she was treated with antibiotic, is unable to tell me which one. Patient states since then though she has felt generalized weakness, malaise, myalgias. No area of pain more significant than others, denies specific chest pain or abdominal pain. Patient denies any other issues urinating, no blood in her urine, no back pain. Patient denies any focal area of weakness. Denies any falls. Patient is been able to ambulate with no difficulty. Does not use assistance while ambulating. Has been eating and drinking well    Review of Systems     Review of Systems   Constitutional: Negative for appetite change, chills and fever. HENT: Negative for ear pain. Eyes: Negative for pain. Respiratory: Negative for cough, chest tightness, shortness of breath and wheezing. Cardiovascular: Negative for chest pain. Gastrointestinal: Negative for abdominal pain, blood in stool, constipation, diarrhea, nausea and vomiting. Genitourinary: Negative for difficulty urinating, dysuria, flank pain and hematuria. Musculoskeletal: Positive for myalgias. Skin: Negative for rash. Neurological: Positive for weakness. Negative for dizziness, tremors, seizures, syncope, facial asymmetry, speech difficulty, light-headedness, numbness and headaches.        Past Medical, Surgical, Family, and Social History     She has a past medical history of Anxiety, Aortic calcification ([address] Arteriosclerosis, Breast cancer [facility]), Carcinoma in situ of breast, Cardiomegaly, Carotid artery disease ([facility] [address] Cerebrovascular disease, unspecified, Chest pain, COPD (chronic obstructive pulmonary disease) ([facility] [address] Coronary artery calcification seen on CT scan, Decreased libido, Depression, Dysthymia, Essential hypertension, Gastroesophageal reflux disease without esophagitis, GERD (gastroesophageal reflux disease), Hx of diseases NEC, Hyperlipidemia, Insomnia, Left thyroid nodule, Low back pain, Malignant neoplasm of ovary ([facility] [address] Memory loss, Osteoporosis, Ovarian cancer ([facility] [address] Pain in joint, pelvic region and thigh, Paralysis of vocal cords or larynx, unspecified, Peripheral vascular disease, unspecified ([facility] [address] Personal history of malignant neoplasm of ovary, Scoliosis, Spondylolisthesis, Urinary incontinence, and Vocal cord dysfunction. She has a past surgical history that includes Colonoscopy (August 25, 2009); Facial cosmetic surgery (July 31, 2012); Facial cosmetic surgery (1999); Tonsillectomy; radha and bso (cervix removed) (1986); Abdominal exploration surgery (1986); Breast lumpectomy (2002); Breast reduction surgery (1981); Breast reconstruction; cystourethroscopy (February 27, 2013); Colonoscopy (01/18/2017); and Mastectomy, bilateral.  Her family history includes Cancer (age of onset: 50) in her daughter; Dementia (age of onset: 68) in her brother. She reports that she has quit smoking. Her smoking use included cigarettes. She has never used smokeless tobacco. She reports current alcohol use. She reports that she does not use drugs. Medications     Previous Medications    ASCORBIC ACID (VITAMIN C) 500 MG TABLET    Take 500 mg by mouth daily. ASPIRIN 81 MG EC TABLET    Take 81 mg by mouth daily. CALCIUM-VITAMIN D (OSCAL-500) 500-200 MG-UNIT PER TABLET    Take 1 tablet by mouth daily.     CARVEDILOL (COREG) 3.125 MG TABLET    Take 3.125 mg by mouth 2 times daily (with meals)    CHOLECALCIFEROL (VITAMIN D) 50 MCG (2000 UT) CAPS CAPSULE    Take 2,000 Units by mouth daily     DONEPEZIL (ARICEPT) 10 MG TABLET    Take 10 mg by mouth nightly     LANSOPRAZOLE (PREVACID) 30 MG DELAYED RELEASE CAPSULE    Take 30 mg by mouth daily    MEMANTINE (NAMENDA) 10 MG TABLET    Take 10 mg by mouth 2 times daily    MULTIPLE VITAMINS-MINERALS (CENTRUM SILVER PO)    Take 1 tablet by mouth daily. RALOXIFENE (EVISTA) 60 MG TABLET    Take 60 mg by mouth daily    SERTRALINE (ZOLOFT) 50 MG TABLET    Take 50 mg by mouth 2 times daily    SOLIFENACIN (VESICARE) 10 MG TABLET    Take 5 mg by mouth nightly    TEMAZEPAM (RESTORIL) 30 MG CAPSULE    Take 30 mg by mouth nightly. VITAMIN B-12 (CYANOCOBALAMIN) 1000 MCG TABLET    Take 1,000 mcg by mouth daily        Allergies     She is allergic to penicillins. Physical Exam     INITIAL VITALS: BP: 122/64, Temp: 98.7 °F (37.1 °C), Pulse: 78, Resp: 16, SpO2: 95 %   Physical Exam  Vitals and nursing note reviewed. Constitutional:       General: She is not in acute distress. Appearance: Normal appearance. She is not ill-appearing, toxic-appearing or diaphoretic. HENT:      Head: Normocephalic and atraumatic. Nose: Nose normal.   Eyes:      Pupils: Pupils are equal, round, and reactive to light. Cardiovascular:      Rate and Rhythm: Normal rate and regular rhythm. Pulses: Normal pulses. Heart sounds: Normal heart sounds. No murmur heard. No friction rub. No gallop. Pulmonary:      Effort: Pulmonary effort is normal. No respiratory distress. Breath sounds: Normal breath sounds. No stridor. No wheezing, rhonchi or rales. Chest:      Chest wall: No tenderness. Abdominal:      General: There is no distension. Palpations: Abdomen is soft. There is no mass. Tenderness: There is no abdominal tenderness. There is no guarding or rebound. Hernia: No hernia is present. Skin:     General: Skin is warm and dry. Neurological:      General: No focal deficit present. Mental Status: She is alert and oriented to person, place, and time. Mental status is at baseline. Cranial Nerves: No cranial nerve deficit. Motor: No weakness. RECENT VITALS:  BP: 122/64, Temp: 98.7 °F (37.1 °C), Pulse: 75,Resp: 15, SpO2: 94 %     DiagnosticResults     EKG   Interpreted in conjunction with emergencydepartment physician Mohan Miramontes MD  Rhythm: normal sinus   Rate: normal  Axis: normal  Ectopy: none  Conduction: normal  ST Segments: no acute change  T Waves:no acute change  Q Waves: none  Clinical Impression: no acute changes    RADIOLOGY:  CT CHEST ABDOMEN PELVIS W CONTRAST   Final Result   CHEST:   1. Diffuse centrilobular emphysematous changes. 2. Patchy groundglass opacities in the lung bases may represent atelectasis. 3. Enlarged and heterogenous left thyroid lobe. Need for further evaluation with ultrasound of the thyroid can be determined clinically. ABDOMEN/PELVIS:   1. No CT evidence of an acute abdominal process. 2. Diverticulosis without CT evidence of an acute diverticulitis. 3. Evidence of fatty liver. 4. Questionable mildly calcified gallbladder wall. Further evaluation could be obtained with ultrasound in proper clinical settings. 5. Evidence of hysterectomy and oophorectomy            XR CHEST PORTABLE   Final Result   1.  Stable chest.   2. No active pulmonary disease          LABS:   Results for orders placed or performed during the hospital encounter of 05/20/21   CBC Auto Differential   Result Value Ref Range    WBC 2.8 (L) 4.0 - 11.0 K/uL    RBC 4.86 4.00 - 5.20 M/uL    Hemoglobin 14.8 12.0 - 16.0 g/dL    Hematocrit 44.7 36.0 - 48.0 %    MCV 92.0 80.0 - 100.0 fL    MCH 30.4 26.0 - 34.0 pg    MCHC 33.1 31.0 - 36.0 g/dL    RDW 14.4 12.4 - 15.4 %    Platelets 69 (L) 174 - 450 K/uL    MPV 10.6 (H) 5.0 - 10.5 fL    PLATELET SLIDE REVIEW Decreased     SLIDE REVIEW see below     Neutrophils % 64.0 %    Lymphocytes % 24.0 %    Monocytes % 0.0 % Eosinophils % 8.0 %    Basophils % 0.0 %    Neutrophils Absolute 1.9 1.7 - 7.7 K/uL    Lymphocytes Absolute 0.7 (L) 1.0 - 5.1 K/uL    Monocytes Absolute 0.0 0.0 - 1.3 K/uL    Eosinophils Absolute 0.2 0.0 - 0.6 K/uL    Basophils Absolute 0.0 0.0 - 0.2 K/uL    Bands Relative 4 0 - 7 %    Vacuolated Neutrophils Present (A)     RBC Morphology Normal    Basic Metabolic Panel w/ Reflex to MG   Result Value Ref Range    Sodium 137 136 - 145 mmol/L    Potassium reflex Magnesium 4.8 3.5 - 5.1 mmol/L    Chloride 104 99 - 110 mmol/L    CO2 22 21 - 32 mmol/L    Anion Gap 11 3 - 16    Glucose 90 70 - 99 mg/dL    BUN 18 7 - 20 mg/dL    CREATININE 0.9 0.6 - 1.2 mg/dL    GFR Non-African American 60 (A) >60    GFR African American >60 >60    Calcium 8.1 (L) 8.3 - 10.6 mg/dL   Troponin   Result Value Ref Range    Troponin <0.01 <0.01 ng/mL   Brain Natriuretic Peptide   Result Value Ref Range    Pro- (H) 0 - 449 pg/mL   Urinalysis, reflex to microscopic   Result Value Ref Range    Color, UA Yellow Straw/Yellow    Clarity, UA CLOUDY (A) Clear    Glucose, Ur Negative Negative mg/dL    Bilirubin Urine Negative Negative    Ketones, Urine 15 (A) Negative mg/dL    Specific Gravity, UA >=1.030 1.005 - 1.030    Blood, Urine TRACE-LYSED (A) Negative    pH, UA 6.0 5.0 - 8.0    Protein,  (A) Negative mg/dL    Urobilinogen, Urine 1.0 <2.0 E.U./dL    Nitrite, Urine Negative Negative    Leukocyte Esterase, Urine TRACE (A) Negative    Microscopic Examination YES     Urine Type NotGiven    Microscopic Urinalysis   Result Value Ref Range    WBC, UA 0-2 0 - 5 /HPF    RBC, UA 0-2 0 - 4 /HPF    Epithelial Cells, UA 11-20 (A) 0 - 5 /HPF    Bacteria, UA 4+ (A) None Seen /HPF   Hepatic Function Panel   Result Value Ref Range    Total Protein 6.1 (L) 6.4 - 8.2 g/dL    Albumin 3.3 (L) 3.4 - 5.0 g/dL    Alkaline Phosphatase 57 40 - 129 U/L    ALT 21 10 - 40 U/L    AST 40 (H) 15 - 37 U/L    Total Bilirubin 0.3 0.0 - 1.0 mg/dL    Bilirubin, Direct management of bandemia, possible UTI. The patient will continue to be monitored here in the emergency department until which time she is moved to her new treatment location. Workup, treatment, and diagnosis were discussed with the patient and/or family members; the patient agrees to the plan and all questions were addressed and answered. This patient was also evaluated by the attending physician. All care plans werediscussed and agreed upon. Clinical Impression     1. Generalized weakness    2. Urinary tract infection without hematuria, site unspecified    3. Bandemia        Disposition     PATIENT REFERRED TO:  No follow-up provider specified.     DISCHARGE MEDICATIONS:  New Prescriptions    No medications on file       DISPOSITION         Suresh Gonzalez MD  Resident  05/20/21 4082

## 2021-05-20 NOTE — ED NOTES
Bed: B15-15  Expected date:   Expected time:   Means of arrival:   [facility]  [facility] 1027

## 2021-05-20 NOTE — ED NOTES
Home Med List is complete. Attempted to ask patient about her home medications; however, she states that she doesn't know the names or what her meds are for. Her pharmacy packages her medications in [address] [place] [address] Street, so she relies on this. [address]; medications in the Home Med List are from pharmacy fills. Patient states that she did not have any meds today. Please note:  Patient received Bactrim DS one PO BID x 10 days on 5/14. Per [facility] documentation, she was instructed by her PCP to stop this on 5/19.       Changes made to the Home Medication List:   Removed:    Restazabrina SAUL    Added:   Vitamin D   Tears opth    Changes:    Sertraline      Luly FrostD., BCPS   5/20/2021 3:29 PM  Wireless: [phone]

## 2021-05-20 NOTE — PROGRESS NOTES
4 Eyes Admission Assessment     I agree as the admission nurse that 2 RN's have performed a thorough Head to Toe Skin Assessment on the patient. ALL assessment sites listed below have been assessed on admission. Areas assessed by both nurses:   [x]   Head, Face, and Ears   [x]   Shoulders, Back, and Chest- breast surgery, old incisions on breasts  [x]   Arms, Elbows, and Hands   [x]   Coccyx, Sacrum, and Ischium  [x]   Legs, Feet, and Heels        Does the Patient have Skin Breakdown?   No         Roger Prevention initiated:  NA   Wound Care Orders initiated:  NA      WOC nurse consulted for Pressure Injury (Stage 3,4, Unstageable, DTI, NWPT, and Complex wounds) or Roger score 18 or lower:  NA      Nurse 1 eSignature: Electronically signed by Michelle Thompson RN on 5/20/21 at 6:05 PM EDT    **SHARE this note so that the co-signing nurse is able to place an eSignature**    Nurse 2 eSignature: Electronically signed by Jayleen Lopez RN on 5/21/21 at 5:35 AM EDT

## 2021-05-20 NOTE — ED NOTES
Report called to [place] receiving RN for [id number].   PCT notified to transport     Dina Franklin RN  05/20/21 0084

## 2021-05-21 LAB
ANION GAP SERPL CALCULATED.3IONS-SCNC: 12 MMOL/L (ref 3–16)
BASOPHILS ABSOLUTE: 0 K/UL (ref 0–0.2)
BASOPHILS RELATIVE PERCENT: 0.7 %
BUN BLDV-MCNC: 18 MG/DL (ref 7–20)
CALCIUM SERPL-MCNC: 8.3 MG/DL (ref 8.3–10.6)
CHLORIDE BLD-SCNC: 105 MMOL/L (ref 99–110)
CO2: 22 MMOL/L (ref 21–32)
CREAT SERPL-MCNC: 0.9 MG/DL (ref 0.6–1.2)
EOSINOPHILS ABSOLUTE: 0.4 K/UL (ref 0–0.6)
EOSINOPHILS RELATIVE PERCENT: 12.8 %
GFR AFRICAN AMERICAN: >60
GFR NON-AFRICAN AMERICAN: 60
GLUCOSE BLD-MCNC: 80 MG/DL (ref 70–99)
HCT VFR BLD CALC: 42.6 % (ref 36–48)
HEMOGLOBIN: 14.1 G/DL (ref 12–16)
LYMPHOCYTES ABSOLUTE: 1 K/UL (ref 1–5.1)
LYMPHOCYTES RELATIVE PERCENT: 35.1 %
MCH RBC QN AUTO: 30.4 PG (ref 26–34)
MCHC RBC AUTO-ENTMCNC: 33.1 G/DL (ref 31–36)
MCV RBC AUTO: 92 FL (ref 80–100)
MONOCYTES ABSOLUTE: 0.4 K/UL (ref 0–1.3)
MONOCYTES RELATIVE PERCENT: 14.4 %
NEUTROPHILS ABSOLUTE: 1 K/UL (ref 1.7–7.7)
NEUTROPHILS RELATIVE PERCENT: 37 %
PDW BLD-RTO: 14.2 % (ref 12.4–15.4)
PLATELET # BLD: 54 K/UL (ref 135–450)
PMV BLD AUTO: 10.7 FL (ref 5–10.5)
POTASSIUM REFLEX MAGNESIUM: 4 MMOL/L (ref 3.5–5.1)
RBC # BLD: 4.63 M/UL (ref 4–5.2)
SODIUM BLD-SCNC: 139 MMOL/L (ref 136–145)
WBC # BLD: 2.8 K/UL (ref 4–11)

## 2021-05-21 PROCEDURE — 97161 PT EVAL LOW COMPLEX 20 MIN: CPT

## 2021-05-21 PROCEDURE — G0378 HOSPITAL OBSERVATION PER HR: HCPCS

## 2021-05-21 PROCEDURE — 6370000000 HC RX 637 (ALT 250 FOR IP): Performed by: INTERNAL MEDICINE

## 2021-05-21 PROCEDURE — 97530 THERAPEUTIC ACTIVITIES: CPT

## 2021-05-21 PROCEDURE — 6360000002 HC RX W HCPCS: Performed by: INTERNAL MEDICINE

## 2021-05-21 PROCEDURE — 97535 SELF CARE MNGMENT TRAINING: CPT

## 2021-05-21 PROCEDURE — 36415 COLL VENOUS BLD VENIPUNCTURE: CPT

## 2021-05-21 PROCEDURE — 97165 OT EVAL LOW COMPLEX 30 MIN: CPT

## 2021-05-21 PROCEDURE — 80048 BASIC METABOLIC PNL TOTAL CA: CPT

## 2021-05-21 PROCEDURE — 96367 TX/PROPH/DG ADDL SEQ IV INF: CPT

## 2021-05-21 PROCEDURE — 85025 COMPLETE CBC W/AUTO DIFF WBC: CPT

## 2021-05-21 PROCEDURE — 96372 THER/PROPH/DIAG INJ SC/IM: CPT

## 2021-05-21 PROCEDURE — 2580000003 HC RX 258: Performed by: INTERNAL MEDICINE

## 2021-05-21 PROCEDURE — 97116 GAIT TRAINING THERAPY: CPT

## 2021-05-21 RX ORDER — QUETIAPINE FUMARATE 25 MG/1
50 TABLET, FILM COATED ORAL ONCE
Status: COMPLETED | OUTPATIENT
Start: 2021-05-21 | End: 2021-05-22

## 2021-05-21 RX ADMIN — CYANOCOBALAMIN TAB 1000 MCG 1000 MCG: 1000 TAB at 08:07

## 2021-05-21 RX ADMIN — Medication 10 ML: at 08:08

## 2021-05-21 RX ADMIN — Medication 2000 UNITS: at 08:07

## 2021-05-21 RX ADMIN — CARVEDILOL 3.12 MG: 3.12 TABLET, FILM COATED ORAL at 16:51

## 2021-05-21 RX ADMIN — ASPIRIN 81 MG: 81 TABLET, COATED ORAL at 08:07

## 2021-05-21 RX ADMIN — VANCOMYCIN HYDROCHLORIDE 1000 MG: 10 INJECTION, POWDER, LYOPHILIZED, FOR SOLUTION INTRAVENOUS at 15:16

## 2021-05-21 RX ADMIN — LORAZEPAM 0.5 MG: 0.5 TABLET ORAL at 20:09

## 2021-05-21 RX ADMIN — ACETAMINOPHEN 650 MG: 325 TABLET ORAL at 04:43

## 2021-05-21 RX ADMIN — TROSPIUM CHLORIDE 20 MG: 20 TABLET, FILM COATED ORAL at 06:07

## 2021-05-21 RX ADMIN — MEMANTINE HYDROCHLORIDE 10 MG: 10 TABLET ORAL at 20:09

## 2021-05-21 RX ADMIN — OXYCODONE HYDROCHLORIDE AND ACETAMINOPHEN 500 MG: 500 TABLET ORAL at 08:07

## 2021-05-21 RX ADMIN — Medication 10 ML: at 20:14

## 2021-05-21 RX ADMIN — PANTOPRAZOLE SODIUM 20 MG: 20 TABLET, DELAYED RELEASE ORAL at 06:07

## 2021-05-21 RX ADMIN — CARVEDILOL 3.12 MG: 3.12 TABLET, FILM COATED ORAL at 08:07

## 2021-05-21 RX ADMIN — TROSPIUM CHLORIDE 20 MG: 20 TABLET, FILM COATED ORAL at 15:16

## 2021-05-21 RX ADMIN — SERTRALINE HYDROCHLORIDE 50 MG: 50 TABLET ORAL at 08:07

## 2021-05-21 RX ADMIN — DONEPEZIL HYDROCHLORIDE 10 MG: 10 TABLET, FILM COATED ORAL at 20:09

## 2021-05-21 RX ADMIN — CALCIUM CARBONATE-VITAMIN D TAB 500 MG-200 UNIT 1 TABLET: 500-200 TAB at 08:07

## 2021-05-21 RX ADMIN — ENOXAPARIN SODIUM 40 MG: 40 INJECTION SUBCUTANEOUS at 08:07

## 2021-05-21 RX ADMIN — MEMANTINE HYDROCHLORIDE 10 MG: 10 TABLET ORAL at 08:07

## 2021-05-21 RX ADMIN — SERTRALINE HYDROCHLORIDE 50 MG: 50 TABLET ORAL at 20:09

## 2021-05-21 ASSESSMENT — PAIN DESCRIPTION - LOCATION: LOCATION: HEAD

## 2021-05-21 ASSESSMENT — PAIN SCALES - GENERAL
PAINLEVEL_OUTOF10: 0
PAINLEVEL_OUTOF10: 3

## 2021-05-21 ASSESSMENT — PAIN DESCRIPTION - ONSET: ONSET: ON-GOING

## 2021-05-21 ASSESSMENT — PAIN DESCRIPTION - DESCRIPTORS: DESCRIPTORS: ACHING

## 2021-05-21 ASSESSMENT — PAIN DESCRIPTION - FREQUENCY: FREQUENCY: CONTINUOUS

## 2021-05-21 NOTE — PROGRESS NOTES
Pt alert and oriented. Pt reported headache, PRN Tylenol given, see MAR. Pt voiding well. Pt had BM this evening. Pt ambulated to bathroom with SBA. Pt calls out appropriately prior to getting out of bed. Pt took evening medications without issue. Pt has call light within reach, bed in lowest position with wheels locked, 2/4 side rails up, and bed alarm on. Will continue to monitor.

## 2021-05-21 NOTE — PROGRESS NOTES
Hospitalist Progress Note      PCP: No primary care provider on file. Date of Admission: 5/20/2021    Chief Complaint: Generalized weakness    Hospital Course: 69-year-old female with past medical history of COPD, depression, Alzheimer dementia presented to hospital with generalized weakness. Subjective: No acute events reported overnight. Patient states that she feels okay this morning and denies any new concerns. Weakness has improved a little bit since yesterday      Medications:  Reviewed    Infusion Medications    sodium chloride       Scheduled Medications    vancomycin  1,000 mg Intravenous Q24H    aspirin  81 mg Oral Daily    vitamin C  500 mg Oral Daily    Vitamin D  2,000 Units Oral Daily    Oyster Shell Calcium/D  1 tablet Oral Daily    vitamin B-12  1,000 mcg Oral Daily    memantine  10 mg Oral BID    carvedilol  3.125 mg Oral BID WC    donepezil  10 mg Oral Nightly    pantoprazole  20 mg Oral QAM AC    raloxifene  60 mg Oral Daily    sertraline  50 mg Oral BID    trospium  20 mg Oral BID AC    sodium chloride flush  5-40 mL Intravenous 2 times per day    enoxaparin  40 mg Subcutaneous Daily     PRN Meds: LORazepam, polyvinyl alcohol, sodium chloride flush, sodium chloride, promethazine **OR** ondansetron, polyethylene glycol, acetaminophen **OR** acetaminophen      Intake/Output Summary (Last 24 hours) at 5/21/2021 1400  Last data filed at 5/21/2021 0810  Gross per 24 hour   Intake 420 ml   Output --   Net 420 ml       Physical Exam Performed:    /71   Pulse 89   Temp 98 °F (36.7 °C) (Oral)   Resp 16   Ht 5' 4\" (1.626 m)   Wt 130 lb (59 kg)   SpO2 93%   BMI 22.31 kg/m²     General appearance: No apparent distress, appears stated age and cooperative. HEENT: Pupils equal, round, and reactive to light. Conjunctivae/corneas clear. Neck: Supple, with full range of motion. No jugular venous distention. Trachea midline. Respiratory:  Normal respiratory effort. Clear to auscultation, bilaterally without Rales/Wheezes/Rhonchi. Cardiovascular: Regular rate and rhythm with normal S1/S2 without murmurs, rubs or gallops. Abdomen: Soft, non-tender, non-distended with normal bowel sounds. Musculoskeletal: No clubbing, cyanosis or edema bilaterally. Full range of motion without deformity. Skin: Skin color, texture, turgor normal.  No rashes or lesions. Neurologic: Nonfocal exam  Psychiatric: Alert and oriented, thought content appropriate, normal insight  Capillary Refill: Brisk,3 seconds, normal   Peripheral Pulses: +2 palpable, equal bilaterally       Labs:   Recent Labs     05/20/21  1147 05/21/21  0606   WBC 2.8* 2.8*   HGB 14.8 14.1   HCT 44.7 42.6   PLT 69* 54*     Recent Labs     05/20/21  1147 05/21/21  0606    139   K 4.8 4.0    105   CO2 22 22   BUN 18 18   CREATININE 0.9 0.9   CALCIUM 8.1* 8.3     Recent Labs     05/20/21  1147   AST 40*   ALT 21   BILIDIR <0.2   BILITOT 0.3   ALKPHOS 57     No results for input(s): INR in the last 72 hours. Recent Labs     05/20/21  1147   TROPONINI <0.01       Urinalysis:      Lab Results   Component Value Date    NITRU Negative 05/20/2021    WBCUA 0-2 05/20/2021    BACTERIA 4+ 05/20/2021    RBCUA 0-2 05/20/2021    BLOODU TRACE-LYSED 05/20/2021    SPECGRAV >=1.030 05/20/2021    GLUCOSEU Negative 05/20/2021    GLUCOSEU NEGATIVE 08/05/2010       Radiology:  CT CHEST ABDOMEN PELVIS W CONTRAST   Final Result   CHEST:   1. Diffuse centrilobular emphysematous changes. 2. Patchy groundglass opacities in the lung bases may represent atelectasis. 3. Enlarged and heterogenous left thyroid lobe. Need for further evaluation with ultrasound of the thyroid can be determined clinically. ABDOMEN/PELVIS:   1. No CT evidence of an acute abdominal process. 2. Diverticulosis without CT evidence of an acute diverticulitis. 3. Evidence of fatty liver. 4. Questionable mildly calcified gallbladder wall.  Further evaluation could be obtained with ultrasound in proper clinical settings. 5. Evidence of hysterectomy and oophorectomy            XR CHEST PORTABLE   Final Result   1. Stable chest.   2. No active pulmonary disease              Assessment/Plan:    Active Hospital Problems    Diagnosis     Generalized weakness [R53.1]      Generalized weakness  -Symptoms have been lingering on since her UTI  -PT/OT following    UTI  -Urine culture positive for Enterococcus faecium. Vancomycin has been started     Thrombocytopenia/leukopenia  -Platelets have been trending down.   Await hematology evaluation     Hypertension  -Continue home medications     Dementia  -Continue memantine/donepezil     Depression  -Continue sertraline    DVT Prophylaxis Lovenox  Diet: DIET GENERAL;  Code Status: Full Code    PT/OT Eval Status: Ongoing  Dispo -discharge likely tomorrow    Jia Saavedra MD

## 2021-05-21 NOTE — PLAN OF CARE
Problem: Falls - Risk of:  Goal: Will remain free from falls  Description: Will remain free from falls  Outcome: Ongoing  Note: Non-skid socks on. Standby assist to BR and room. Tolerates well. Alarm on for safety. Call light within reach. Reinforced use of call light. Bed in lowest position.

## 2021-05-21 NOTE — PROGRESS NOTES
VSS. A&Ox4. Denies any pain or discomfort qshift. Up to BR, adequate UO. Yellow, clear, no odor. SBA. IV abx intermittently infusing. Tolerates well. Tolerating diet, No reports of N/V. Family updated of plan of care. BM this am. Door open for safety. Alarm on. Non-skid socks on. Call light within reach. freq rounding throughout shift. All needs met at this time.  Will cont to monitor

## 2021-05-21 NOTE — PROGRESS NOTES
Physical Therapy    Facility/Department: [address]  Initial Assessment/ Treatment    NAME: Yvette Maciel  : 1940  MRN: [id number]    Date of Service: 2021    Discharge Recommendations:    Yvette Maciel scored a 20/24 on the AM-PAC short mobility form. Current research shows that an AM-PAC score of 18 or greater is typically associated with a discharge to the patient's home setting. Based on the patient's AM-PAC score and their current functional mobility deficits, it is recommended that the patient have 2-3 sessions per week of Physical Therapy at d/c to increase the patient's independence. At this time, this patient demonstrates the endurance and safety to discharge home with home services and a follow up treatment frequency of 2-3x/wk. Please see assessment section for further patient specific details. If patient discharges prior to next session this note will serve as a discharge summary. Please see below for the latest assessment towards goals. HOME HEALTH CARE: LEVEL 1 STANDARD    - Initial home health evaluation to occur within 24-48 hours, in patient home   - Therapy to evaluate with goal of regaining prior level of functioning   - Therapy to evaluate if patient has [address] needs for personal care    PT Equipment Recommendations  Equipment Needed: Yes  Mobility Devices: Juan Jose Sanchez: Rolling    Assessment   Body structures, Functions, Activity limitations: Decreased functional mobility ; Decreased endurance;Decreased safe awareness;Decreased balance  Assessment: Pt is a 79 y.o. female who presents to [facility], [facility]. with generalized weakness. Pt performing functional mobility including transfers and gait with SBA/CGA. Pt is functioning slightly below functionla baseline and would benefit from skilled PT to improve independence with functional mobility, LE strength, balance, and activity tolerance.  Pt lives at [facility]] with her  and states she has 24/7 cosmetic surgery (1999); Tonsillectomy; radha and bso (cervix removed) (1986); Abdominal exploration surgery (1986); Breast lumpectomy (2002); Breast reduction surgery (1981); Breast reconstruction; cystourethroscopy (February 27, 2013); Colonoscopy (01/18/2017); and Mastectomy, bilateral.    Restrictions  Position Activity Restriction  Other position/activity restrictions: up as tolerated  Vision/Hearing  Vision: Impaired  Vision Exceptions: Wears glasses for reading  Hearing: Exceptions to [facility]     Subjective  General  Chart Reviewed: Yes  Patient assessed for rehabilitation services?: Yes  Response To Previous Treatment: Not applicable  Family / Caregiver Present: No  Referring Practitioner: Carmelita Poe MD  Referral Date : 05/20/21  Diagnosis: Generalized weakness  Follows Commands: Within Functional Limits  General Comment  Comments: Pt supine in bed upon entry to room and agreeable to PT evaluation.   Pain Screening  Patient Currently in Pain: Denies  Vital Signs  Patient Currently in Pain: Denies     Orientation  Orientation  Overall Orientation Status: Within Normal Limits  Social/Functional History  Social/Functional History  Lives With: Spouse ( has an aid 7 days per week for most of the day)  Type of Home: Facility ([facility])  Home Layout:  (8th floor)  Home Access: Elevator, Level entry  Bathroom Shower/Tub: Walk-in shower  Bathroom Toilet: Standard  Bathroom Equipment: Grab bars in shower, Shower chair  Home Equipment: Reacher, Sock aid, Rolling walker ( has walker)  Receives Help From: Other (comment) (facility staff)  ADL Assistance: Independent  Homemaking Assistance: Needs assistance  Homemaking Responsibilities: No  Ambulation Assistance: Independent  Transfer Assistance: Independent  Active : No  Patient's  Info: Mayra Mullins (husbands aide)  Leisure & Hobbies: reading  Additional Comments: no recent falls, pt is a questionable historian  Cognition Cognition  Arousal/Alertness: Appropriate responses to stimuli  Following Commands: Follows one step commands consistently  Attention Span: Appears intact  Memory: Decreased recall of recent events;Decreased short term memory  Insights: Decreased awareness of deficits    Objective  AROM RLE (degrees)  RLE AROM: WFL  AROM LLE (degrees)  LLE AROM : WFL  Strength RLE  Strength RLE: WFL  Comment: grossly 4-/5  Strength LLE  Strength LLE: WFL  Comment: grossly 4-/5  Tone RLE  RLE Tone: Normotonic  Tone LLE  LLE Tone: Normotonic  Motor Control  Gross Motor?: WFL  Sensation  Overall Sensation Status: WFL  Bed mobility  Supine to Sit:  (x2)  Sit to Supine: Independent  Scooting: Independent  Comment: HOB down and no use of bed rails  Transfers  Sit to Stand: Stand by assistance (w/ and w/o RW, from EOB and toilet)  Stand to sit: Stand by assistance (w/ and w/o RW, to EOB, recliner, and toilet)  Bed to Chair: Contact guard assistance (w/ RW)  Ambulation  Ambulation?: Yes  More Ambulation?: Yes  Ambulation 1  Surface: level tile  Device: No Device  Assistance: Contact guard assistance  Quality of Gait: decreased RYLIE (scissoring), increased instability, reaching for items in room  Gait Deviations: Decreased step length;Decreased step height  Distance: 15'  Ambulation 2  Surface - 2: level tile  Device 2: [address] [facility] 2: Contact guard assistance  Quality of Gait 2: decreased RYLIE (scissoring), decreased step length/height, decreased toe clearance  Gait Deviations: Slow Denise;Decreased step length;Decreased step height  Distance: 20' + 125'     Balance  Sitting - Static: Good  Sitting - Dynamic: Good  Standing - Static: Fair;+  Standing - Dynamic: Fair  Comments: Pt performing ADL's in bathroom including hand washing, washing her face, and combing hair ~5' with SBA.  Pt seated EOB performing dynamic tasks ~10' with independence      Plan   Plan  Times per week: 2-5  Current Treatment Recommendations: Strengthening, Transfer Training, Balance Training, Functional Mobility Training, Gait Training, Endurance Training, Neuromuscular Re-education, Safety Education & Training, Equipment Evaluation, Education, & procurement  Safety Devices  Type of devices: All fall risk precautions in place, Call light within reach, Chair alarm in place, Left in chair, Nurse notified    AM-PAC Score  AM-PAC Inpatient Mobility Raw Score : 20 (05/21/21 1155)  AM-PAC Inpatient T-Scale Score : 47.67 (05/21/21 1155)  Mobility Inpatient CMS 0-100% Score: 35.83 (05/21/21 1155)  Mobility Inpatient CMS G-Code Modifier : Farheen Salvage (05/21/21 1155)    Goals  Short term goals  Time Frame for Short term goals: by d/c  Short term goal 1: Pt will perform transfers with supervision and use of LRAD  Short term goal 2: Pt will ambulate 200' with LRAD and supervision  Short term goal 3: Pt will demonstratefair + dynamic standing balance.   Patient Goals   Patient goals : return home     Therapy Time   Individual Concurrent Group Co-treatment   Time In 1015         Time Out 1056         Minutes 41         Timed Code Treatment Minutes: 26 Minutes (+15 minutes)     Total Treatment Time: 41 minutes    Morena Davis, PT, DPT

## 2021-05-21 NOTE — CONSULTS
Clinical Pharmacy Consult Note    Admit date: 5/20/2021    Subjective/Objective:  79 yof with PMHx of COPD, Alzheimer dementia p/w generalized weakness. Pt states she was diagnosed with U TI ~2 weeks ago and has not recovered despite completing course of abx. Urine culture positive for E faecium, so broad spectrum antibiotics initiated. Pharmacy is consulted to dose Vancomycin per Dr. Chrisitna Sahu    Pertinent Medications:  Vancomycin, pharmacy to dose -- day # 1   1g IV q24h (5/21-current)    Recent Labs     05/20/21  1147 05/21/21  0606    139   K 4.8 4.0    105   CO2 22 22   BUN 18 18   CREATININE 0.9 0.9       Estimated Creatinine Clearance: 42 mL/min (based on SCr of 0.9 mg/dL). Recent Labs     05/20/21  1147 05/21/21  0606   WBC 2.8* 2.8*   HGB 14.8 14.1   HCT 44.7 42.6   MCV 92.0 92.0   PLT 69* 54*       Height:  5' 4\" (162.6 cm)  Weight: 130 lb (59 kg)    Micro:  Urine (5/20): E faecium, sensitivities pending    Assessment/Plan:  1. UTI:  vancomycin - day #1  Vancomycin  · Will start vancomycin 1g IV q24h. Provides ~ 17 mg/kg and is based on a half-life elimination of 18 hours. · Trough will be ordered when appropriate. Target 10-15 mcg/mL for UTI. · Renal function will be monitored closely and dosing will be adjusted as appropriate. Please call with any questions.   Kenneth Vences, SantoshD, BCPS  Wireless: [address] pharmacy: [id number]  5/21/2021 1:50 PM

## 2021-05-21 NOTE — FLOWSHEET NOTE
05/21/21 1026   Encounter Summary   Services provided to: Patient   Referral/Consult From: Elizabeth   Continue Visiting   (es 5/21)   Complexity of Encounter Low   Length of Encounter 15 minutes   Routine   Type Initial   Assessment Approachable   Intervention Active listening   Outcome Engaged in conversation   brief conversation. Therapy came as soon as the visit began. If I am able, I'll drop by again later.

## 2021-05-21 NOTE — H&P
Hospital Medicine History & Physical      PCP: No primary care provider on file. Date of Admission: 5/20/2021    Date of Service: Pt seen/examined on 5/20/2021    Chief Complaint:      Chief Complaint   Patient presents with    Fatigue     recently dx with UTI, since has felt weak and generalized pain       History Of Present Illness:     50-year-old female with past medical history of COPD, depression, Alzheimer dementia presented to hospital with generalized weakness. Patient states that she was diagnosed with a urinary tract infection about 2 weeks ago and since then she been feeling weak. She said that she completed her antibiotic course for this and her symptoms of UTI improved but she still been feeling very weak. She denies any fevers, chills, nausea or vomiting. Her main complaint was the fact that she still does not feel well so she decided come to the hospital.  On arrival she was noted to be hemodynamically stable. Lab work showed leukopenia and thrombocytopenia. UA was unremarkable for infection. Patient had CT chest abdomen performed which did not show any acute pathological findings.   Patient was admitted to the hospital for further management      Past Medical History:        Diagnosis Date    Anxiety     Aortic calcification ([address] 12/17/2012    Arteriosclerosis     Breast cancer ([address] 5/2/2013    Carcinoma in situ of breast     Cardiomegaly 12/5/2012    Carotid artery disease ([address] 8/3/2010    Cerebrovascular disease, unspecified     Chest pain 10/12/2010    COPD (chronic obstructive pulmonary disease) ([address]     Coronary artery calcification seen on CT scan 12/5/2012    Decreased libido     Depression     Dysthymia 11/28/2016    Essential hypertension 11/25/2015    Gastroesophageal reflux disease without esophagitis 11/28/2016    GERD (gastroesophageal reflux disease) 11/24/2014    Hx of diseases NEC     Hyperlipidemia     Insomnia 9/21/2011    Left thyroid nodule 12/5/2012    Low back pain 6/26/2012    Malignant neoplasm of ovary ([address]     Memory loss     Osteoporosis     Ovarian cancer ([address]     Pain in joint, pelvic region and thigh     Paralysis of vocal cords or larynx, unspecified     Peripheral vascular disease, unspecified ([address]     Personal history of malignant neoplasm of ovary     Scoliosis 12/17/2012    Spondylolisthesis 07/26/2012    L4-5 stenosis / Epidural Steroid injection    Urinary incontinence     Vocal cord dysfunction        Past Surgical History:        Procedure Laterality Date    ABDOMINAL EXPLORATION SURGERY  1986    BREAST LUMPECTOMY  2002    left breast    BREAST RECONSTRUCTION      BREAST REDUCTION SURGERY  1981    COLONOSCOPY  August 25, 2009    Dr. Luis Fernando Lopez  01/18/2017    Dr. Jarad Webb  February 27, 2013    Dr. Jia Hill --- [facility]  July 31, 2012    Dr. Damian Flores, [address]         Medications Prior to Admission:    Prior to Admission medications    Medication Sig Start Date End Date Taking?  Authorizing Provider   carvedilol (COREG) 3.125 MG tablet Take 3.125 mg by mouth 2 times daily (with meals)   Yes Historical Provider, MD   donepezil (ARICEPT) 10 MG tablet Take 10 mg by mouth nightly    Yes Historical Provider, MD   lansoprazole (PREVACID) 30 MG delayed release capsule Take 30 mg by mouth daily   Yes Historical Provider, MD   raloxifene (EVISTA) 60 MG tablet Take 60 mg by mouth daily   Yes Historical Provider, MD   sertraline (ZOLOFT) 50 MG tablet Take 50 mg by mouth 2 times daily   Yes Historical Provider, MD   solifenacin (VESICARE) 10 MG tablet Take 5 mg by mouth nightly   Yes Historical Provider, MD   dextran 70-hypromellose (TEARS PURE) 0.1-0.3 % SOLN opthalmic solution Place 1 drop into both eyes as needed (dry eyes)   Yes Historical Provider, MD   memantine (NAMENDA) 10 MG tablet Take 10 mg by mouth 2 times daily   Yes Historical Provider, MD   temazepam (RESTORIL) 30 MG capsule Take 30 mg by mouth nightly. Yes Historical Provider, MD   vitamin B-12 (CYANOCOBALAMIN) 1000 MCG tablet Take 1,000 mcg by mouth daily    Yes Historical Provider, MD   calcium-vitamin D (OSCAL-500) 500-200 MG-UNIT per tablet Take 1 tablet by mouth daily. Yes Historical Provider, MD   Multiple Vitamins-Minerals (CENTRUM SILVER PO) Take 1 tablet by mouth daily. 10/12/10  Yes Historical Provider, MD   aspirin 81 MG EC tablet Take 81 mg by mouth daily. Yes Historical Provider, MD   Ascorbic Acid (VITAMIN C) 500 MG tablet Take 500 mg by mouth daily. Yes Historical Provider, MD   Cholecalciferol (VITAMIN D) 50 MCG (2000 UT) CAPS capsule Take 2,000 Units by mouth daily    Yes Historical Provider, MD       Allergies:  Penicillins    Social History:       reports that she has quit smoking. Her smoking use included cigarettes. She has never used smokeless tobacco. She reports current alcohol use. She reports that she does not use drugs. Family History:  Reviewed in detail and Positive as follows:        Problem Relation Age of Onset    Cancer Daughter 50        transitional cell CA in the fallopian tube    Dementia Brother 68       REVIEW OF SYSTEMS:   Positive review  noted in the HPI. All other systems reviewed and negative.     PHYSICAL EXAM:    /65   Pulse 71   Temp 98.4 °F (36.9 °C) (Oral)   Resp 16   Ht 5' 4\" (1.626 m)   SpO2 94%   BMI 22.02 kg/m²   General Appearance: alert and oriented to person, place and time, well developed and well- nourished, in no acute distress  Skin: warm and dry, no rash or erythema  Head: normocephalic and atraumatic  Eyes: pupils equal, round, and reactive to light, extraocular eye movements intact, conjunctivae normal  ENT: tympanic membrane, external ear and ear canal normal bilaterally, nose input(s): LABGRAM, LABANAE, ORG, CXSURG in the last 72 hours. Nasal Culture: No results for input(s): ORG, MRSAPCR in the last 72 hours. Blood Culture: No results for input(s): BC, BLOODCULT2, ORG in the last 72 hours. Fungal Culture:   No results for input(s): FUNGSM in the last 72 hours. No results for input(s): FUNCXBLD in the last 72 hours. CSF Culture:  No results for input(s): COLORCSF, APPEARCSF, CFTUBE, CLOTCSF, WBCCSF, RBCCSF, NEUTCSF, NUMCELLSCSF, LYMPHSCSF, MONOCSF, GLUCCSF, VOLCSF in the last 72 hours. Respiratory Culture:  No results for input(s): Jeremy Ej in the last 72 hours. AFB:No results for input(s): AFBSMEAR in the last 72 hours. Urine Culture  No results for input(s): LABURIN in the last 72 hours. RADIOLOGY:    CT CHEST ABDOMEN PELVIS W CONTRAST   Final Result   CHEST:   1. Diffuse centrilobular emphysematous changes. 2. Patchy groundglass opacities in the lung bases may represent atelectasis. 3. Enlarged and heterogenous left thyroid lobe. Need for further evaluation with ultrasound of the thyroid can be determined clinically. ABDOMEN/PELVIS:   1. No CT evidence of an acute abdominal process. 2. Diverticulosis without CT evidence of an acute diverticulitis. 3. Evidence of fatty liver. 4. Questionable mildly calcified gallbladder wall. Further evaluation could be obtained with ultrasound in proper clinical settings. 5. Evidence of hysterectomy and oophorectomy            XR CHEST PORTABLE   Final Result   1. Stable chest.   2. No active pulmonary disease          Previous medical records personally reviewed and analyzed         PHYSICIAN CERTIFICATION    I certify that Nellie Everett is expected to be hospitalized for <2 midnights based on the following assessment and plan:    ASSESSMENT/PLAN:    Generalized weakness  -Symptoms have been lingering on since her UTI.   She denies any other signs of infection at this point  -PT/OT consult  -Urine and blood cultures were ordered by the ER    Thrombocytopenia/leukopenia  -Hematology has been consulted    Hypertension  -Continue home medications    Dementia  -Continue memantine/donepezil    Depression  -Continue sertraline    DVT Prophylaxis: Lovenox  Diet: DIET GENERAL;  Code Status: Full Code  PT/OT Eval Status: Consulted    Dispo -pending clinical course       Layo Pringle MD  The note was completed using EMR. Every effort was made to ensure accuracy; however, inadvertent computerized transcription errors may be present. Thank you No primary care provider on file. for the opportunity to be involved in this patient's care. If you have any questions or concerns please feel free to contact me at [phone].

## 2021-05-21 NOTE — FLOWSHEET NOTE
05/21/21 1250   Encounter Summary   Services provided to: Patient   Referral/Consult From: Rounding   Continue Visiting   (es 5/21)   Complexity of Encounter Moderate   Length of Encounter 15 minutes   Routine   Type Follow up   Assessment Approachable   Intervention Active listening;Prayer   Outcome Engaged in conversation;Receptive

## 2021-05-21 NOTE — CARE COORDINATION
CM following, from [facility], plans to return home no needs once medically stable.   +UTI on Abx, Hemoc consult for down trending platelets (54 today) Electronically signed by Dina Alejandre RN on 5/21/2021 at 3:34 PM  [phone]

## 2021-05-21 NOTE — PROGRESS NOTES
Occupational Therapy   Occupational Therapy Initial Assessment/tx/discharge  Date: 2021   Patient Name: Juarez Vora  MRN: [id number]     : 1940    Date of Service: 2021    Discharge Recommendations:  Juarez Vora scored a 23/24 on the AM-[facility] ADL Inpatient form. At this time, no further OT is recommended upon discharge due to pt's level of indep and A at home. Recommend patient returns to prior setting with prior services. OT Equipment Recommendations  Equipment Needed: No    Assessment   Assessment: Pt appears to be functioning close to baseline level, S/SBA with functional transfers and ADLs. She has 24 hr A at home, no need for acute OT. Recommend returning home with 24 hr A/S. Decision Making: Low Complexity  OT Education: OT Role  Patient Education: verbalized understanding  REQUIRES OT FOLLOW UP: No  Activity Tolerance  Activity Tolerance: Patient Tolerated treatment well  Activity Tolerance: 1 time, pt reported feeling weak and layed on bed, )2 sat=95%, HT=85, VL=98901, then symptoms resolved and pt resumed activities  Safety Devices  Safety Devices in place: Yes  Type of devices: Left in chair;Nurse notified;Call light within reach; Chair alarm in place           Patient Diagnosis(es): The primary encounter diagnosis was Generalized weakness. Diagnoses of Urinary tract infection without hematuria, site unspecified and Bandemia were also pertinent to this visit.      has a past medical history of Anxiety, Aortic calcification ([address] Arteriosclerosis, Breast cancer ([address] Carcinoma in situ of breast, Cardiomegaly, Carotid artery disease ([address] Cerebrovascular disease, unspecified, Chest pain, COPD (chronic obstructive pulmonary disease) ([address] Coronary artery calcification seen on CT scan, Decreased libido, Depression, Dysthymia, Essential hypertension, Gastroesophageal reflux disease without esophagitis, GERD (gastroesophageal reflux disease), Hx of diseases NEC, Hyperlipidemia, Insomnia, Left thyroid nodule, Low back pain, Malignant neoplasm of ovary ([address] Memory loss, Osteoporosis, Ovarian cancer ([address] Pain in joint, pelvic region and thigh, Paralysis of vocal cords or larynx, unspecified, Peripheral vascular disease, unspecified ([address] Personal history of malignant neoplasm of ovary, Scoliosis, Spondylolisthesis, Urinary incontinence, and Vocal cord dysfunction. has a past surgical history that includes Colonoscopy (August 25, 2009); Facial cosmetic surgery (July 31, 2012); Facial cosmetic surgery (1999); Tonsillectomy; radha and bso (cervix removed) (1986); Abdominal exploration surgery (1986); Breast lumpectomy (2002); Breast reduction surgery (1981); Breast reconstruction; cystourethroscopy (February 27, 2013); Colonoscopy (01/18/2017); and Mastectomy, bilateral.           Restrictions  Position Activity Restriction  Other position/activity restrictions: up as tolerated    Subjective   General  Chart Reviewed: Yes  Patient assessed for rehabilitation services?: Yes  Additional Pertinent Hx: PMH:  breast cancer, ovariann cancer, COPD, hyperlipidemia, essential HTN, anxiety, Alzhemier's  Family / Caregiver Present: No  Referring Practitioner: Dr. Nena Morris  Diagnosis: Pt admitted with weakness, reporting she was diagnosed with UTI a couple of weeks previously-dx UTI without hematuria-chest CT=possible atelectasis, abd CT=neg CXR=neg  Subjective  Subjective: Pt in bed, agreeable to work with OT.   Patient Currently in Pain: Denies  Vital Signs  Temp: 98 °F (36.7 °C)  Temp Source: Oral  Pulse: 89  Heart Rate Source: Monitor  Resp: 16  BP: 112/71  BP Location: Left upper arm  MAP (mmHg): 85  Patient Position: Semi fowlers  Patient Currently in Pain: Denies  Oxygen Therapy  SpO2: 93 %  O2 Device: None (Room air)  Social/Functional History  Social/Functional History  Lives With: Spouse ( has an aid 7 days per week for most of the day)  Type of Home: Facility ([facility] [facility])  Home Layout:  (8th floor)  Home Access: Elevator, Level entry  Bathroom Shower/Tub: Walk-in shower  Bathroom Toilet: Standard  Bathroom Equipment: Grab bars in shower, Shower chair  Home Equipment: Reacher, Sock aid, Rolling walker ( has walker)  Receives Help From: Other (comment) (facility staff)  ADL Assistance: Independent  Homemaking Assistance: Needs assistance  Homemaking Responsibilities: No  Ambulation Assistance: Independent  Transfer Assistance: Independent  Active : No  Patient's  Info: Jerry Danielson (husbands aide)  Leisure & Hobbies: reading  Additional Comments: no recent falls, pt is a questionable historian       Objective        Orientation  Overall Orientation Status: Within Normal Limits     Balance  Sitting Balance: Independent  Standing Balance: Stand by assistance  Standing Balance  Time: overall ~5  minutes  Activity: functional transfers, ADLs, mobility  Toilet Transfers  Toilet - Technique: Ambulating (without A device, to/rom bathroom with CGA)  Equipment Used: Standard toilet (grab bar)  Toilet Transfer: Supervision  ADL  Grooming: Independent (washing face and hands, brushing teeth, combing hair while standing at sink with S/SBA for safety)  LE Dressing: Independent (doffing donning socks)  Toileting: Supervision (for safety only)        Bed mobility  Supine to Sit: Independent (bed flat)  Sit to Supine: Independent (bed flat)  Scooting: Independent  Transfers  Sit to stand: Stand by assistance  Stand to sit: Stand by assistance     Cognition  Overall Cognitive Status: Exceptions  Arousal/Alertness: Appropriate responses to stimuli  Following Commands:  Follows one step commands consistently  Attention Span: Appears intact  Memory: Decreased recall of recent events;Decreased short term memory  Insights: Decreased awareness of deficits        Sensation  Overall Sensation Status: WFL        LUE AROM (degrees)  LUE AROM : WFL  Left Hand AROM (degrees)  Left Hand AROM: WFL  RUE AROM (degrees)  RUE AROM : WFL  LUE Strength  Gross LUE Strength: WFL  RUE Strength  Gross RUE Strength: WFL     Hand Dominance  Hand Dominance: Right             Plan   Plan  Plan Comment: d/c OT    G-Code     OutComes Score                                                  AM-PAC Score        AM-PAC Inpatient Daily Activity Raw Score: 23 (05/21/21 1151)  AM-PAC Inpatient ADL T-Scale Score : 51.12 (05/21/21 1151)  ADL Inpatient CMS 0-100% Score: 15.86 (05/21/21 1151)  ADL Inpatient CMS G-Code Modifier : CI (05/21/21 1151)              Therapy Time   Individual Concurrent Group Co-treatment   Time In 1015         Time Out 1056         Minutes 41              Timed Code Treatment Minutes:   26    Total Treatment Minutes:  [address], OTR/L [address]

## 2021-05-22 PROBLEM — N39.0 UTI (URINARY TRACT INFECTION): Status: ACTIVE | Noted: 2021-05-22

## 2021-05-22 LAB
ORGANISM: ABNORMAL
URINE CULTURE, ROUTINE: ABNORMAL

## 2021-05-22 PROCEDURE — 6370000000 HC RX 637 (ALT 250 FOR IP): Performed by: INTERNAL MEDICINE

## 2021-05-22 PROCEDURE — 2580000003 HC RX 258: Performed by: INTERNAL MEDICINE

## 2021-05-22 PROCEDURE — 1200000000 HC SEMI PRIVATE

## 2021-05-22 PROCEDURE — 96372 THER/PROPH/DIAG INJ SC/IM: CPT

## 2021-05-22 PROCEDURE — 6360000002 HC RX W HCPCS: Performed by: INTERNAL MEDICINE

## 2021-05-22 RX ORDER — NITROFURANTOIN 25; 75 MG/1; MG/1
100 CAPSULE ORAL EVERY 12 HOURS SCHEDULED
Status: DISCONTINUED | OUTPATIENT
Start: 2021-05-22 | End: 2021-05-23 | Stop reason: HOSPADM

## 2021-05-22 RX ORDER — QUETIAPINE FUMARATE 25 MG/1
25 TABLET, FILM COATED ORAL 2 TIMES DAILY
Status: DISCONTINUED | OUTPATIENT
Start: 2021-05-22 | End: 2021-05-23 | Stop reason: HOSPADM

## 2021-05-22 RX ADMIN — CARVEDILOL 3.12 MG: 3.12 TABLET, FILM COATED ORAL at 08:59

## 2021-05-22 RX ADMIN — NITROFURANTOIN (MONOHYDRATE/MACROCRYSTALS) 100 MG: 75; 25 CAPSULE ORAL at 11:10

## 2021-05-22 RX ADMIN — CYANOCOBALAMIN TAB 1000 MCG 1000 MCG: 1000 TAB at 08:59

## 2021-05-22 RX ADMIN — ASPIRIN 81 MG: 81 TABLET, COATED ORAL at 08:59

## 2021-05-22 RX ADMIN — PANTOPRAZOLE SODIUM 20 MG: 20 TABLET, DELAYED RELEASE ORAL at 06:52

## 2021-05-22 RX ADMIN — QUETIAPINE FUMARATE 50 MG: 25 TABLET ORAL at 00:09

## 2021-05-22 RX ADMIN — MEMANTINE HYDROCHLORIDE 10 MG: 10 TABLET ORAL at 19:54

## 2021-05-22 RX ADMIN — Medication 2000 UNITS: at 08:58

## 2021-05-22 RX ADMIN — DONEPEZIL HYDROCHLORIDE 10 MG: 10 TABLET, FILM COATED ORAL at 19:54

## 2021-05-22 RX ADMIN — CARVEDILOL 3.12 MG: 3.12 TABLET, FILM COATED ORAL at 17:44

## 2021-05-22 RX ADMIN — OXYCODONE HYDROCHLORIDE AND ACETAMINOPHEN 500 MG: 500 TABLET ORAL at 08:59

## 2021-05-22 RX ADMIN — TROSPIUM CHLORIDE 20 MG: 20 TABLET, FILM COATED ORAL at 17:47

## 2021-05-22 RX ADMIN — QUETIAPINE FUMARATE 25 MG: 25 TABLET ORAL at 11:10

## 2021-05-22 RX ADMIN — TROSPIUM CHLORIDE 20 MG: 20 TABLET, FILM COATED ORAL at 06:52

## 2021-05-22 RX ADMIN — MEMANTINE HYDROCHLORIDE 10 MG: 10 TABLET ORAL at 08:58

## 2021-05-22 RX ADMIN — SERTRALINE HYDROCHLORIDE 50 MG: 50 TABLET ORAL at 19:54

## 2021-05-22 RX ADMIN — LORAZEPAM 0.5 MG: 0.5 TABLET ORAL at 21:30

## 2021-05-22 RX ADMIN — ENOXAPARIN SODIUM 40 MG: 40 INJECTION SUBCUTANEOUS at 08:58

## 2021-05-22 RX ADMIN — Medication 10 ML: at 09:00

## 2021-05-22 RX ADMIN — NITROFURANTOIN (MONOHYDRATE/MACROCRYSTALS) 100 MG: 75; 25 CAPSULE ORAL at 21:30

## 2021-05-22 RX ADMIN — SERTRALINE HYDROCHLORIDE 50 MG: 50 TABLET ORAL at 08:59

## 2021-05-22 RX ADMIN — CALCIUM CARBONATE-VITAMIN D TAB 500 MG-200 UNIT 1 TABLET: 500-200 TAB at 11:10

## 2021-05-22 RX ADMIN — QUETIAPINE FUMARATE 25 MG: 25 TABLET ORAL at 19:54

## 2021-05-22 RX ADMIN — Medication 10 ML: at 19:55

## 2021-05-22 ASSESSMENT — PAIN SCALES - GENERAL: PAINLEVEL_OUTOF10: 0

## 2021-05-22 NOTE — PROGRESS NOTES
Pt Alert. Pt is not oriented to the situation. Forgets limitation. VSS. RN reoriented the patient. Pt used the bathroom twice this morning. BM. Pt walks independently with supervision. Pt denies any pain. Pt back to bed bed in lower position. Call lights within reach.

## 2021-05-22 NOTE — PROGRESS NOTES
Hospitalist Progress Note      PCP: No primary care provider on file. Date of Admission: 5/20/2021    Chief Complaint: Generalized weakness    Hospital Course: 69-year-old female with past medical history of COPD, depression, Alzheimer dementia presented to hospital with generalized weakness. Subjective: No acute events reported overnight. Agitated, trying to get out of bed. Wants to be discharged     Medications:  Reviewed    Infusion Medications    sodium chloride       Scheduled Medications    nitrofurantoin (macrocrystal-monohydrate)  100 mg Oral 2 times per day    QUEtiapine  25 mg Oral BID    aspirin  81 mg Oral Daily    vitamin C  500 mg Oral Daily    Vitamin D  2,000 Units Oral Daily    Oyster Shell Calcium/D  1 tablet Oral Daily    vitamin B-12  1,000 mcg Oral Daily    memantine  10 mg Oral BID    carvedilol  3.125 mg Oral BID WC    donepezil  10 mg Oral Nightly    pantoprazole  20 mg Oral QAM AC    raloxifene  60 mg Oral Daily    sertraline  50 mg Oral BID    trospium  20 mg Oral BID AC    sodium chloride flush  5-40 mL Intravenous 2 times per day    enoxaparin  40 mg Subcutaneous Daily     PRN Meds: LORazepam, polyvinyl alcohol, sodium chloride flush, sodium chloride, promethazine **OR** ondansetron, polyethylene glycol, acetaminophen **OR** acetaminophen      Intake/Output Summary (Last 24 hours) at 5/22/2021 1024  Last data filed at 5/22/2021 0051  Gross per 24 hour   Intake 480 ml   Output 600 ml   Net -120 ml       Physical Exam Performed:    BP (!) 153/85   Pulse 87   Temp 98.3 °F (36.8 °C) (Oral)   Resp 16   Ht 5' 4\" (1.626 m)   Wt 130 lb (59 kg)   SpO2 94%   BMI 22.31 kg/m²     General appearance: No apparent distress, appears stated age and cooperative. Anxious   HEENT: Pupils equal, round, and reactive to light. Conjunctivae/corneas clear. Neck: Supple, with full range of motion. No jugular venous distention. Trachea midline.   Respiratory:  Normal respiratory effort. Clear to auscultation, bilaterally without Rales/Wheezes/Rhonchi. Cardiovascular: Regular rate and rhythm with normal S1/S2 without murmurs, rubs or gallops. Abdomen: Soft, non-tender, non-distended with normal bowel sounds. Musculoskeletal: No clubbing, cyanosis or edema bilaterally. Full range of motion without deformity. Skin: Skin color, texture, turgor normal.  No rashes or lesions. Neurologic: Nonfocal exam  Psychiatric: Alert and oriented, agitated, anxious   Capillary Refill: Brisk,3 seconds, normal   Peripheral Pulses: +2 palpable, equal bilaterally       Labs:   Recent Labs     05/20/21  1147 05/21/21  0606   WBC 2.8* 2.8*   HGB 14.8 14.1   HCT 44.7 42.6   PLT 69* 54*     Recent Labs     05/20/21  1147 05/21/21  0606    139   K 4.8 4.0    105   CO2 22 22   BUN 18 18   CREATININE 0.9 0.9   CALCIUM 8.1* 8.3     Recent Labs     05/20/21  1147   AST 40*   ALT 21   BILIDIR <0.2   BILITOT 0.3   ALKPHOS 57     No results for input(s): INR in the last 72 hours. Recent Labs     05/20/21  1147   TROPONINI <0.01       Urinalysis:      Lab Results   Component Value Date    NITRU Negative 05/20/2021    WBCUA 0-2 05/20/2021    BACTERIA 4+ 05/20/2021    RBCUA 0-2 05/20/2021    BLOODU TRACE-LYSED 05/20/2021    SPECGRAV >=1.030 05/20/2021    GLUCOSEU Negative 05/20/2021    GLUCOSEU NEGATIVE 08/05/2010       Radiology:  CT CHEST ABDOMEN PELVIS W CONTRAST   Final Result   CHEST:   1. Diffuse centrilobular emphysematous changes. 2. Patchy groundglass opacities in the lung bases may represent atelectasis. 3. Enlarged and heterogenous left thyroid lobe. Need for further evaluation with ultrasound of the thyroid can be determined clinically. ABDOMEN/PELVIS:   1. No CT evidence of an acute abdominal process. 2. Diverticulosis without CT evidence of an acute diverticulitis. 3. Evidence of fatty liver. 4. Questionable mildly calcified gallbladder wall.  Further evaluation could

## 2021-05-22 NOTE — PLAN OF CARE
Problem: Falls - Risk of:  Goal: Will remain free from falls  Description: Will remain free from falls  Outcome: Ongoing     Problem: Injury - Risk of, Physical Injury:  Goal: Will remain free from falls  Description: Will remain free from falls  Outcome: Ongoing

## 2021-05-23 VITALS
WEIGHT: 130 LBS | RESPIRATION RATE: 16 BRPM | TEMPERATURE: 98.7 F | DIASTOLIC BLOOD PRESSURE: 74 MMHG | HEART RATE: 99 BPM | OXYGEN SATURATION: 93 % | SYSTOLIC BLOOD PRESSURE: 118 MMHG | BODY MASS INDEX: 22.2 KG/M2 | HEIGHT: 64 IN

## 2021-05-23 LAB
BASOPHILS ABSOLUTE: 0 K/UL (ref 0–0.2)
BASOPHILS RELATIVE PERCENT: 0.5 %
EOSINOPHILS ABSOLUTE: 0.3 K/UL (ref 0–0.6)
EOSINOPHILS RELATIVE PERCENT: 6.8 %
HCT VFR BLD CALC: 43.2 % (ref 36–48)
HEMOGLOBIN: 14.3 G/DL (ref 12–16)
LYMPHOCYTES ABSOLUTE: 2 K/UL (ref 1–5.1)
LYMPHOCYTES RELATIVE PERCENT: 48.6 %
MCH RBC QN AUTO: 30.3 PG (ref 26–34)
MCHC RBC AUTO-ENTMCNC: 33.1 G/DL (ref 31–36)
MCV RBC AUTO: 91.7 FL (ref 80–100)
MONOCYTES ABSOLUTE: 0.5 K/UL (ref 0–1.3)
MONOCYTES RELATIVE PERCENT: 11.8 %
NEUTROPHILS ABSOLUTE: 1.3 K/UL (ref 1.7–7.7)
NEUTROPHILS RELATIVE PERCENT: 32.3 %
PDW BLD-RTO: 14.3 % (ref 12.4–15.4)
PLATELET # BLD: 74 K/UL (ref 135–450)
PMV BLD AUTO: 10.3 FL (ref 5–10.5)
RBC # BLD: 4.71 M/UL (ref 4–5.2)
WBC # BLD: 4.1 K/UL (ref 4–11)

## 2021-05-23 PROCEDURE — 36415 COLL VENOUS BLD VENIPUNCTURE: CPT

## 2021-05-23 PROCEDURE — 6370000000 HC RX 637 (ALT 250 FOR IP): Performed by: INTERNAL MEDICINE

## 2021-05-23 PROCEDURE — 85025 COMPLETE CBC W/AUTO DIFF WBC: CPT

## 2021-05-23 PROCEDURE — 6360000002 HC RX W HCPCS: Performed by: INTERNAL MEDICINE

## 2021-05-23 RX ORDER — NITROFURANTOIN 25; 75 MG/1; MG/1
100 CAPSULE ORAL EVERY 12 HOURS SCHEDULED
Qty: 6 CAPSULE | Refills: 0 | Status: SHIPPED | OUTPATIENT
Start: 2021-05-23 | End: 2021-05-26

## 2021-05-23 RX ADMIN — MEMANTINE HYDROCHLORIDE 10 MG: 10 TABLET ORAL at 08:26

## 2021-05-23 RX ADMIN — NITROFURANTOIN (MONOHYDRATE/MACROCRYSTALS) 100 MG: 75; 25 CAPSULE ORAL at 08:26

## 2021-05-23 RX ADMIN — CYANOCOBALAMIN TAB 1000 MCG 1000 MCG: 1000 TAB at 08:26

## 2021-05-23 RX ADMIN — TROSPIUM CHLORIDE 20 MG: 20 TABLET, FILM COATED ORAL at 06:34

## 2021-05-23 RX ADMIN — ENOXAPARIN SODIUM 40 MG: 40 INJECTION SUBCUTANEOUS at 08:29

## 2021-05-23 RX ADMIN — SERTRALINE HYDROCHLORIDE 50 MG: 50 TABLET ORAL at 08:26

## 2021-05-23 RX ADMIN — CALCIUM CARBONATE-VITAMIN D TAB 500 MG-200 UNIT 1 TABLET: 500-200 TAB at 14:07

## 2021-05-23 RX ADMIN — Medication 2000 UNITS: at 08:26

## 2021-05-23 RX ADMIN — OXYCODONE HYDROCHLORIDE AND ACETAMINOPHEN 500 MG: 500 TABLET ORAL at 08:26

## 2021-05-23 RX ADMIN — QUETIAPINE FUMARATE 25 MG: 25 TABLET ORAL at 08:26

## 2021-05-23 RX ADMIN — ASPIRIN 81 MG: 81 TABLET, COATED ORAL at 08:26

## 2021-05-23 RX ADMIN — PANTOPRAZOLE SODIUM 20 MG: 20 TABLET, DELAYED RELEASE ORAL at 06:34

## 2021-05-23 NOTE — PLAN OF CARE
Problem: Falls - Risk of:  Goal: Will remain free from falls  Description: Will remain free from falls  5/22/2021 2248 by Nelly Bolton RN  Outcome: Ongoing     Problem: Falls - Risk of:  Goal: Absence of physical injury  Description: Absence of physical injury  Outcome: Ongoing     Problem: Confusion - Acute:  Goal: Absence of continued neurological deterioration signs and symptoms  Description: Absence of continued neurological deterioration signs and symptoms  Outcome: Ongoing     Problem: Confusion - Acute:  Goal: Mental status will be restored to baseline  Description: Mental status will be restored to baseline  Outcome: Ongoing     Problem: Discharge Planning:  Goal: Ability to perform activities of daily living will improve  Description: Ability to perform activities of daily living will improve  Outcome: Ongoing     Problem: Discharge Planning:  Goal: Participates in care planning  Description: Participates in care planning  Outcome: Ongoing     Problem: Injury - Risk of, Physical Injury:  Goal: Will remain free from falls  Description: Will remain free from falls  5/22/2021 2248 by Nelly Bolton RN  Outcome: Ongoing     Problem: Injury - Risk of, Physical Injury:  Goal: Absence of physical injury  Description: Absence of physical injury  Outcome: Ongoing     Problem: Mood - Altered:  Goal: Mood stable  Description: Mood stable  Outcome: Ongoing     Problem: Mood - Altered:  Goal: Absence of abusive behavior  Description: Absence of abusive behavior  Outcome: Ongoing     Problem: Mood - Altered:  Goal: Verbalizations of feeling emotionally comfortable while being cared for will increase  Description: Verbalizations of feeling emotionally comfortable while being cared for will increase  Outcome: Ongoing     Problem: Psychomotor Activity - Altered:  Goal: Absence of psychomotor disturbance signs and symptoms  Description: Absence of psychomotor disturbance signs and symptoms  Outcome: Ongoing Problem: Sensory Perception - Impaired:  Goal: Demonstrations of improved sensory functioning will increase  Description: Demonstrations of improved sensory functioning will increase  Outcome: Ongoing     Problem: Sensory Perception - Impaired:  Goal: Decrease in sensory misperception frequency  Description: Decrease in sensory misperception frequency  Outcome: Ongoing     Problem: Sensory Perception - Impaired:  Goal: Able to refrain from responding to false sensory perceptions  Description: Able to refrain from responding to false sensory perceptions  Outcome: Ongoing     Problem: Sensory Perception - Impaired:  Goal: Demonstrates accurate environmental perceptions  Description: Demonstrates accurate environmental perceptions  Outcome: Ongoing     Problem: Sensory Perception - Impaired:  Goal: Able to distinguish between reality-based and nonreality-based thinking  Description: Able to distinguish between reality-based and nonreality-based thinking  Outcome: Ongoing     Problem: Sensory Perception - Impaired:  Goal: Able to interrupt nonreality-based thinking  Description: Able to interrupt nonreality-based thinking  Outcome: Ongoing     Problem: Sleep Pattern Disturbance:  Goal: Appears well-rested  Description: Appears well-rested  Outcome: Ongoing

## 2021-05-23 NOTE — PROGRESS NOTES
Physical Therapy  [address] [place] [address] PT orders received. Chart reviewed. PT evaluation completed 5/21 with recommendations for home PT. Discussed with RN, no change in status, patient to d/c home today, no new PT evaluation warranted. Plan to continue per previously established plan of care.     Gary Bermeo PT, DPT [id number]

## 2021-05-23 NOTE — PROGRESS NOTES
IV site out. No com placation noted. Discharge instructions provided to Pt and her step daughter. Verbalized understanding. RN walked Pt  to the [facility] with wheelchair.

## 2021-05-23 NOTE — PROGRESS NOTES
This patient remained Alert but only oriented to self during this shift, VSS, patient remains confused and trying to exit the bed without supervision. Re oriented on using her call light by nurse, patient complained of a headache this morning at 0700 AM, patient was also angry and agitated because her breakfast tray had not arrived at 0700 AM, patient is to be given Asprin by AM nurse, bed in lowest position, bed alarm on, call light within reach, will continue to monitor.

## 2021-05-23 NOTE — DISCHARGE INSTR - COC
Continuity of Care Form    Patient Name: Yvtete Maciel   :  1940  MRN:  [id number]    Admit date:  2021  Discharge date:  ***    Code Status Order: Full Code   Advance Directives:   Advance Care Flowsheet Documentation       Date/Time Healthcare Directive Type of Healthcare Directive Copy in [address] Agent's Name Healthcare Agent's Phone Number    21  Yes, patient has an advance directive for healthcare treatment  Durable power of  for health care;Living will  No, copy requested from family  Spouse  Estrella Matta  [phone]            Admitting Physician:  Aaliyah Casper MD  PCP: No primary care provider on file.     Discharging Nurse: [facility] Unit/Room#: 5305/5305-01  Discharging Unit Phone Number: ***    Emergency Contact:   Extended Emergency Contact Information  Primary Emergency Contact: Earleneeloise Jules  Address: [address] [place] [address] Phone: [phone]  Work Phone: [phone]  Relation: Spouse    Past Surgical History:  Past Surgical History:   Procedure Laterality Date    ABDOMINAL EXPLORATION SURGERY      BREAST LUMPECTOMY      left breast    BREAST RECONSTRUCTION      BREAST REDUCTION SURGERY  1981    COLONOSCOPY  2009    Dr. Aayush Bialey  2017    Dr. Ankur Mark  2013    Dr. Jia Hill --- [facility]  2012    Dr. Rhina Valentine, [facility]         Immunization History:   Immunization History   Administered Date(s) Administered    Influenza Vaccine, unspecified formulation 2016    Influenza Virus Vaccine 2012, 10/04/2013, 10/15/2014    Influenza Whole 10/13/2010    Pneumococcal Conjugate 13-valent (Quique Spies) 2015 Pneumococcal Polysaccharide ([id number]) 08/31/2012    Tdap (Boostrix, Adacel) 11/07/2013    Zoster Live (Zostavax) 12/29/2008       Active Problems:  Patient Active Problem List   Diagnosis Code    Urinary incontinence R32    Carotid artery disease I77.9    Cerebrovascular disease I67.9    Vocal cord dysfunction J38.3    Personal history of malignant neoplasm of ovary Z85.43    Decreased libido R68.82    Pain in joint, pelvic region and thigh M25.559    Depression F32.9    Peripheral vascular disease (HCC) I73.9    Anxiety F41.9    COPD (chronic obstructive pulmonary disease) ([facility]) J44.9    Memory loss R41.3    Hyperlipidemia E78.5    Personal history of other specified diseases Z87.898    Arteriosclerosis I70.90    Osteoporosis M81.0    Ovarian cancer ([facility] C56.9    Carcinoma in situ of breast D05.90    Chest pain R07.9    Degenerative arthritis of finger M19.049    Insomnia G47.00    Hoarseness R49.0    Low back pain M54.5    Hypocalcemia E83.51    Leukopenia D72.819    Unsteady gait R26.81    Left hip pain M25.552    Need for pneumococcal vaccination Z23    Left shoulder pain M25.512    Abdominal pain R10.9    Left thyroid nodule E04.1    Coronary artery calcification seen on CT scan I25.10    Cardiomegaly I51.7    Aortic calcification ([facility]) I70.0    Scoliosis M41.9    Elevated blood pressure reading without diagnosis of hypertension R03.0    Breast cancer ([facility]) C50.919    Epistaxis R04.0    Palpitations R00.2    Upper respiratory infection J06.9    Essential hypertension I10    Upper respiratory tract infection J06.9    Dysthymia F34.1    Gastroesophageal reflux disease without esophagitis K21.9    Bronchitis J40    Syncope and collapse R55    Orthostatic hypotension I95.1    Generalized weakness R53.1    UTI (urinary tract infection) N39.0       Isolation/Infection:   Isolation            No Isolation          Patient Infection Status       Infection Onset Added Last Indicated Last Indicated By Review Planned Expiration Resolved Resolved By    None active    Resolved    COVID-19 Rule Out 06/23/20 06/23/20 06/23/20 COVID-19 (Ordered)   06/24/20 Rule-Out Test Resulted            Nurse Assessment:  Last Vital Signs: BP 95/66   Pulse 93   Temp 98.5 °F (36.9 °C) (Oral)   Resp 16   Ht 5' 4\" (1.626 m)   Wt 130 lb (59 kg)   SpO2 94%   BMI 22.31 kg/m²     Last documented pain score (0-10 scale): Pain Level: 0  Last Weight:   Wt Readings from Last 1 Encounters:   05/20/21 130 lb (59 kg)     Mental Status:  {IP PT MENTAL STATUS:20030:::0}    IV Access:  { FAUSTO IV ACCESS:464404042:::0}    Nursing Mobility/ADLs:  Walking   {CHP DME ADLs:908753280:::0}  Transfer  {CHP DME ADLs:066734456:::0}  Bathing  {CHP DME ADLs:873694076:::0}  Dressing  {CHP DME ADLs:153078367:::0}  Toileting  {CHP DME ADLs:993124483:::0}  Feeding  {CHP DME ADLs:110281471:::0}  Med Admin  {CHP DME ADLs:398277698:::0}  Med Delivery   { FAUSTO MED Delivery:732689041:::0}    Wound Care Documentation and Therapy:        Elimination:  Continence: Bowel: {YES / AJ:83787}  Bladder: {YES / YT:52704}  Urinary Catheter: {Urinary Catheter:430719104:::0}   Colostomy/Ileostomy/Ileal Conduit: {YES / Y:39092}       Date of Last BM: ***    Intake/Output Summary (Last 24 hours) at 5/23/2021 1032  Last data filed at 5/23/2021 0947  Gross per 24 hour   Intake 480 ml   Output --   Net 480 ml     I/O last 3 completed shifts:   In: 240 [P.O.:240]  Out: -     Safety Concerns:     508 IPDIA Safety Concerns:675818140:::0}    Impairments/Disabilities:      508 IPDIA Impairments/Disabilities:626025517:::0}    Nutrition Therapy:  Current Nutrition Therapy:   Farrah Haines FAUSTO Diet List:189578669:::0}    Routes of Feeding: {CHP DME Other Feedings:355779234:::0}  Liquids: {Slp liquid thickness:89963}  Daily Fluid Restriction: {CHP DME Yes amt example:399747527:::0}  Last Modified Barium Swallow with Video (Video Swallowing Test): {Done Not Done KZIZ:950991623:::1}    Treatments at the Time of Hospital Discharge:   Respiratory Treatments: ***  Oxygen Therapy:  {Therapy; copd oxygen:96870:::0}  Ventilator:    {[facility] Vent List:786382314:::0}    Rehab Therapies: {THERAPEUTIC INTERVENTION:2840093687}  Weight Bearing Status/Restrictions: {[facility] Weight Bearin:::0}  Other Medical Equipment (for information only, NOT a DME order):  {EQUIPMENT:493857984}  Other Treatments: ***    Patient's personal belongings (please select all that are sent with patient):  {CHP DME Belongings:205846662:::0}    RN SIGNATURE:  {Esignature:174081004:::0}    CASE MANAGEMENT/SOCIAL WORK SECTION    Inpatient Status Date: ***    Readmission Risk Assessment Score:  Readmission Risk              Risk of Unplanned Readmission:  18           Discharging to Facility/ Agency   Name:   Address:  Phone:  Fax:    Dialysis Facility (if applicable)   Name:  Address:  Dialysis Schedule:  Phone:  Fax:    / signature: {Esignature:829167596:::0}    PHYSICIAN SECTION    Prognosis: Fair    Condition at Discharge: Stable    Rehab Potential (if transferring to Rehab): Good    Recommended Labs or Other Treatments After Discharge:     PT OT   Follow with pcp in 1 week      Physician Certification: I certify the above information and transfer of Robert Acosta  is necessary for the continuing treatment of the diagnosis listed and that she requires Home Care    Update Admission H&P: No change in H&P    PHYSICIAN SIGNATURE:  Electronically signed by Ji Boothe MD on 21 at 10:36 AM EDT

## 2021-05-23 NOTE — CARE COORDINATION
Case Management Assessment            Discharge Note                    Date / Time of Note: 5/23/2021 10:42 AM                  Discharge Note Completed by: Mercy Mustafa RN    Patient Name: Terese Flores   YOB: 1940  Diagnosis: Generalized weakness [R53.1]  UTI (urinary tract infection) [N39.0]   Date / Time: 5/20/2021 10:26 AM    Current PCP: No primary care provider on file. Clinic patient: No    Hospitalization in the last 30 days: No    Advance Directives:  Code Status: Full Code  [place] DNR form completed and on chart: Not Indicated    Financial:  Payor: MEDICARE / Plan: MEDICARE PART A AND B / Product Type: *No Product type* /      Pharmacy:    [place] [address] [phone] - F [phone]  [address] [place] [address]. [address] [place] [address]  Phone: [phone] Fax: [phone]      Assistance purchasing medications?: Potential Assistance Purchasing Medications: No  Assistance provided by Case Management: None at this time    Does patient want to participate in local refill/ meds to beds program?: Yes    Meds To Beds General Rules:  1. Can ONLY be done Monday- Friday between 8:30am-5pm  2. Prescription(s) must be in pharmacy by 3pm to be filled same day  3. Copy of patient's insurance/ prescription drug card and patient face sheet must be sent along with the prescription(s)  4. Cost of Rx cannot be added to hospital bill. If financial assistance is needed, please contact unit  or ;  or  CANNOT provide pharmacy voucher for patients co-pays  5.  Patients can then  the prescription on their way out of the hospital at discharge, or pharmacy can deliver to the bedside if staff is available. (payment due at time of pick-up or delivery - cash, check, or card accepted)     Able to afford home medications/ co-pay costs: Yes    ADLS:  Current PT AM-PAC Score: 20 /24  Current OT AM-PAC Score: 23 /24      DISCHARGE Disposition: Home with [address]: [facility]     LOC at discharge: Not Applicable  FAUSTO Completed: Not Indicated    Notification completed in HENS/PAS?:  Not Applicable    IMM Completed:   Not Indicated    Transportation:  Transportation PLAN for discharge: family   Mode of Transport: [address] ordered at discharge: Yes  [address]: [facility]   Phone: [phone]  Fax: [phone]  Orders faxed: Yes, Rosalind Luque aware of DC will follow at [facility]:  DME Provider: none  Equipment obtained during hospitalization:     Home Oxygen and Respiratory Equipment:  Oxygen needed at discharge?: Not [address]: Not Applicable  Portable tank available for discharge?: Not Indicated    Dialysis:  Dialysis patient: No    Dialysis Center:  Not Applicable      Additional CM Notes: from [place] at [facility], will Dc home with Lily Ortega following,      The Plan for Transition of Care is related to the following treatment goals of Generalized weakness [R53.1]  UTI (urinary tract infection) [N39.0]    The Patient and/or patient representative Ciarra Mir and her family were provided with a choice of provider and agrees with the discharge plan Yes    Freedom of choice list was provided with basic dialogue that supports the patient's individualized plan of care/goals and shares the quality data associated with the providers.  Yes    Care Transitions patient: No    Laurie Mortimer, RN  [facility]  Case Management Department  Ph: [phone]  Fax: [phone]

## 2021-05-23 NOTE — DISCHARGE SUMMARY
Discharge Summary    Date:5/23/2021        Patient Name:Regine Colunga     YOB: 1940     Age:80 y.o. Admit Date:5/20/2021   Admission Condition:fair   Discharged Condition:good  Discharge Date: 05/23/21    Discharge Diagnoses   Active Problems:    Generalized weakness    UTI (urinary tract infection)  Resolved Problems:    * No resolved hospital problems. [facility] Stay   Narrative of Hospital Course:     76-year-old female admitted with generalized weakness. Patient had a diagnosis of urinary tract infection, urine culture grew Enterococcus. Patient was initially treated with IV vancomycin. Her urine culture was sensitive to Macrobid. Her GFR is adequate to treat her with Macrobid. Her generalized weakness is improved. She had work with the PT OT, her PT score was 20, OT score was 23 out of 24. She will be discharged. FAUSTO is filled him with outpatient PT OT recommendations. On dc Patient denies any CP,SOB,Denies any nausea, vomiting, abdominal pain. Denies any diarrhea. Patient denies any palpitations, lightheadedness, orthopnea, PND. Patient doesn't appear to be in any distress on discharge . Discussed with case management to arrange PT OT. Physical exam   Vitals:    05/23/21 0326 05/23/21 0734 05/23/21 1002 05/23/21 1125   BP: (!) 159/87 90/71 95/66 118/74   Pulse: 79 84 93 99   Resp: 16 16  16   Temp: 98 °F (36.7 °C) 98.5 °F (36.9 °C)  98.7 °F (37.1 °C)   TempSrc: Oral Oral  Oral   SpO2: 93% 94%  93%   Weight:       Height:             Physical Exam  Constitutional:       Appearance: Normal appearance. HENT:      Head: Normocephalic and atraumatic. Cardiovascular:      Rate and Rhythm: Normal rate and regular rhythm. Pulses: Normal pulses. Heart sounds: Normal heart sounds. Pulmonary:      Effort: Pulmonary effort is normal.      Breath sounds: Normal breath sounds. Abdominal:      General: Abdomen is flat.  Bowel sounds are normal.      Palpations: Abdomen is soft. Neurological:      General: No focal deficit present. Mental Status: She is alert and oriented to person, place, and time. Cranial Nerves: No cranial nerve deficit. Sensory: No sensory deficit. Comments: Strength 5/5 in all 4 extremities. Psychiatric:         Mood and Affect: Mood normal.         Behavior: Behavior normal.           Consultants:   IP CONSULT TO HOSPITALIST  IP CONSULT TO HEMATOLOGY  IP CONSULT TO HOSPITALIST  PHARMACY TO DOSE VANCOMYCIN  IP CONSULT TO HEM/ONC  IP CONSULT TO HOME CARE NEEDS    Time Spent on Discharge:  45 minutes were spent in patient examination, evaluation, counseling as well as medication reconciliation, prescriptions for required medications, discharge plan and follow up. Surgeries/Procedures Performed:   None        Significant Diagnostic Studies:   Recent Labs:  CBC:   Recent Labs     05/21/21  0606 05/23/21  0458   WBC 2.8* 4.1   HGB 14.1 14.3   HCT 42.6 43.2   MCV 92.0 91.7   PLT 54* 74*     BMP:   Recent Labs     05/21/21  0606      K 4.0      CO2 22   BUN 18   CREATININE 0.9     Mag: No results found for: MG  LIVER PROFILE: No results for input(s): AST, ALT, LIPASE, BILIDIR, BILITOT, ALKPHOS in the last 72 hours. Invalid input(s): AMYLASE,  ALB  PT/INR: No results for input(s): PROTIME, INR in the last 72 hours. APTT: No results for input(s): APTT in the last 72 hours. BNP:  No results for input(s): BNP in the last 72 hours. CARDIAC ENZYMES: No results for input(s): CKTOTAL, CKMB, TROPONINI in the last 72 hours. Radiology Last 7 Days:  CT CHEST ABDOMEN PELVIS W CONTRAST   Final Result   CHEST:   1. Diffuse centrilobular emphysematous changes. 2. Patchy groundglass opacities in the lung bases may represent atelectasis. 3. Enlarged and heterogenous left thyroid lobe. Need for further evaluation with ultrasound of the thyroid can be determined clinically. ABDOMEN/PELVIS:   1.  No CT evidence of CENTRUM SILVER PO     donepezil 10 MG tablet  Commonly known as: ARICEPT     lansoprazole 30 MG delayed release capsule  Commonly known as: PREVACID     memantine 10 MG tablet  Commonly known as: NAMENDA     raloxifene 60 MG tablet  Commonly known as: EVISTA     sertraline 50 MG tablet  Commonly known as: ZOLOFT     Tears Pure 0.1-0.3 % Soln opthalmic solution  Generic drug: dextran 70-hypromellose     temazepam 30 MG capsule  Commonly known as: RESTORIL     VESIcare 10 MG tablet  Generic drug: solifenacin     vitamin B-12 1000 MCG tablet  Commonly known as: CYANOCOBALAMIN     vitamin C 500 MG tablet  Commonly known as: ASCORBIC ACID     vitamin D 50 MCG (2000 UT) Caps capsule           Where to Get Your Medications      You can get these medications from any pharmacy    Bring a paper prescription for each of these medications  · nitrofurantoin (macrocrystal-monohydrate) 100 MG capsule         Electronically signed by Lisa Renteria MD on 5/23/21 at 12:25 PM EDT

## 2021-05-23 NOTE — CONSULTS
unspecified ([facility]     Personal history of malignant neoplasm of ovary     Scoliosis 2012    Spondylolisthesis 2012    L4-5 stenosis / Epidural Steroid injection    Urinary incontinence     Vocal cord dysfunction        Past Surgical History:    Past Surgical History:   Procedure Laterality Date    ABDOMINAL EXPLORATION SURGERY  1986    BREAST LUMPECTOMY  2002    left breast    BREAST RECONSTRUCTION      BREAST REDUCTION SURGERY  1981    COLONOSCOPY  2009    Dr. Jerry Umanzor  2017    Dr. Lukas Hoyos  2013    Dr. Georgina Figueroa --- [facility]  2012    Dr. Agnieszka Chapman, BILATERAL      [address]          Social History:    Social History     Socioeconomic History    Marital status:      Spouse name: Guru Edge Number of children: 2+2    Years of education: Not on file    Highest education level: Not on file   Occupational History    Not on file   Tobacco Use    Smoking status: Former Smoker     Types: Cigarettes    Smokeless tobacco: Never Used    Tobacco comment: quit smoking in    Substance and Sexual Activity    Alcohol use: Yes     Comment: social    Drug use: No    Sexual activity: Yes     Partners: Male     Comment:  - [facility]   Other Topics Concern    Not on file   Social History Narrative        Past Medical History     Onset of First Menses at Age: 14    : 2, Para: 2    # Vaginal Deliveries: 2    Episiotomy                                Last updated by Armond Fatima MD on 2009                      Social History     Marital Status:  (second time)    Spouse: Mary Dominique: 2       Employment Status: retired      Occupation: [employer] since     Patient Lives at: home       Support System: excellent    Caffeine per Day: 3    Seatbelt Use: 100 % of the time    Alcohol Use: 2 drinks/day    Drug Use: none    Tobacco Usage: prior smoker    Cigarettes - Year Quit: twice, last time     Cigarettes - Years smoked - 20      Passive Smoke Exposure: yes    First marriage 16 years      of kidney cancer. Zana's wife  of breast cancer.  25 yrs. Sexually Active: Yes    Total # of Sex Partners: 10-20                                               Last updated by Amanda Delvalle MD on 2008                      Family History     mother - Scot Rollins -  - age 80 - stroke, pneumonia, dementia    father - Damon Bonner -  - age 79 - stomach cancer        brother - Amy Mueller - dementia         daughter - Alba Yun - healer in [place]     daughter - Scot Rollins - teaches history at [employer] [employer]                                 Last updated by Amanda Delvalle MD on 2016          Social Determinants of Health     Financial Resource Strain:     Difficulty of Paying Living Expenses:    Food Insecurity:     Worried About Running Out of Food in the Last Year:     [address] in the Last Year:    Transportation Needs:     Lack of Transportation (Medical):      Lack of Transportation (Non-Medical):    Physical Activity:     Days of Exercise per Week:     Minutes of Exercise per Session:    Stress:     Feeling of Stress :    Social Connections:     Frequency of Communication with Friends and Family:     Frequency of Social Gatherings with Friends and Family:     Attends [employer] Services:     Active Member of Clubs or Organizations:     Attends Club or Organization Meetings:     Marital Status:    Intimate Partner Violence:     Fear of Current or Ex-Partner:     Emotionally Abused:     Physically Abused:     Sexually Abused:         Family History:    Family History   Problem Relation Age of Onset    Cancer Daughter 50        transitional cell CA in the fallopian tube    Dementia Brother 73        Allergies:     Allergies   Allergen Reactions    Penicillins Hives        Medications:    Current Facility-Administered Medications   Medication Dose Route Frequency Provider Last Rate Last Admin    nitrofurantoin (macrocrystal-monohydrate) (MACROBID) capsule 100 mg  100 mg Oral 2 times per day Nikhil Garcia MD   100 mg at 05/23/21 0826    QUEtiapine (SEROQUEL) tablet 25 mg  25 mg Oral BID Nikhil Garcia MD   25 mg at 05/23/21 0826    aspirin EC tablet 81 mg  81 mg Oral Daily Debora Ang MD   81 mg at 05/23/21 1309    ascorbic acid (VITAMIN C) tablet 500 mg  500 mg Oral Daily Debora Ang MD   500 mg at 05/23/21 4848    Vitamin D (CHOLECALCIFEROL) tablet 2,000 Units  2,000 Units Oral Daily Debora Ang MD   2,000 Units at 05/23/21 6135    Oyster Shell Calcium/D 500-200 MG-UNIT 1 tablet  1 tablet Oral Daily Debora Ang MD   1 tablet at 05/22/21 1110    vitamin B-12 (CYANOCOBALAMIN) tablet 1,000 mcg  1,000 mcg Oral Daily Debora Ang MD   1,000 mcg at 05/23/21 0826    memantine (NAMENDA) tablet 10 mg  10 mg Oral BID Debora Ang MD   10 mg at 05/23/21 4751    LORazepam (ATIVAN) tablet 0.5 mg  0.5 mg Oral Nightly PRN Debora Ang MD   0.5 mg at 05/22/21 2130    carvedilol (COREG) tablet 3.125 mg  3.125 mg Oral BID WC Debora Ang MD   3.125 mg at 05/22/21 1744    donepezil (ARICEPT) tablet 10 mg  10 mg Oral Nightly Debora Ang MD   10 mg at 05/22/21 1954    pantoprazole (PROTONIX) tablet 20 mg  20 mg Oral QAM AC Debora Ang MD   20 mg at 05/23/21 7050    raloxifene (EVISTA) tablet 60 mg  60 mg Oral Daily Debora Ang MD        sertraline (ZOLOFT) tablet 50 mg  50 mg Oral BID Debora Ang MD   50 mg at 05/23/21 0826    trospium (SANCTURA) tablet 20 mg  20 mg Oral BID [facility], MD   20 mg at 05/23/21 9472    polyvinyl alcohol (LIQUIFILM TEARS) 1.4 % ophthalmic solution 1 drop  1 drop Both Eyes PRN Aaliyah Casper MD        sodium chloride flush 0.9 % injection 5-40 mL  5-40 mL Intravenous 2 times per day Aaliyah Casper MD   10 mL at 05/22/21 1955    sodium chloride flush 0.9 % injection 5-40 mL  5-40 mL Intravenous PRN Aaliyah Casper MD        0.9 % sodium chloride infusion  25 mL Intravenous PRN Aaliyah Casper MD        enoxaparin (LOVENOX) injection 40 mg  40 mg Subcutaneous Daily Aaliyah Casper MD   40 mg at 05/23/21 6762    promethazine (PHENERGAN) tablet 12.5 mg  12.5 mg Oral Q6H PRN Aaliyah Casper MD        Or    ondansetron [facility]) injection 4 mg  4 mg Intravenous Q6H PRN Aaliyah Casper MD        polyethylene glycol (GLYCOLAX) packet 17 g  17 g Oral Daily PRN Aaliyah Casper MD        acetaminophen (TYLENOL) tablet 650 mg  650 mg Oral Q6H PRN Aaliyah Casper MD   650 mg at 05/21/21 1422    Or    acetaminophen (TYLENOL) suppository 650 mg  650 mg Rectal Q6H PRN Aaliyah Casper MD              Review of Systems:    · History obtained from patient, otherwise, further 10 point ROS is negative        Physical Exam:    BP 90/71   Pulse 84   Temp 98.5 °F (36.9 °C) (Oral)   Resp 16   Ht 5' 4\" (1.626 m)   Wt 130 lb (59 kg)   SpO2 94%   BMI 22.31 kg/m²     General appearance: alert and cooperative; no acute distress  Head: NCAT, PERRLA, EOMI; oral and nasopharynx without lesions, dentition adequate repair  Neck: no JVD or thyromegaly  Lungs: clear to auscultation bilaterally; no audible rhonchi, rales, wheezes or crackles  Heart: regular rate and rhythm, S1, S2 normal; no murmurs, rubs or gallops  Abdomen: soft, non-tender and non-distended; normoactive, tympanic bowel sounds; no hepatosplenomegaly  Extremities: no cyanosis, clubbing, edema or asymmetry  Skin: no jaundice, purpura or petechiae  Lymph Nodes:  no auricular, cervical, supraclavicular, axillary, inguinal or popliteal lymphadenopathy  Neurologic:  CN 2-12 intact; no focal motor, sensory or cerebellar deficits        Laboratory Evaluation:      CBC:   Lab Results   Component Value Date    WBC 4.1 05/23/2021    NEUTROABS 1.3 05/23/2021    HGB 14.3 05/23/2021    MCV 91.7 05/23/2021    PLT 74 05/23/2021       BMP:   Lab Results   Component Value Date     05/21/2021    K 4.0 05/21/2021    CO2 22 05/21/2021    BUN 18 05/21/2021    CREATININE 0.9 05/21/2021    TSH 2.25 11/30/2016       HEPATIC:  Lab Results   Component Value Date    AST 40 05/20/2021    ALT 21 05/20/2021    ALKPHOS 57 05/20/2021    PROT 6.1 05/20/2021    PROT 6.3 06/26/2012    BILITOT 0.3 05/20/2021    BILIDIR <0.2 05/20/2021           Radiology Evaluation:    Reviewed        Assessment / Plan:    Leukopenia, neutropenia - improving  Thrombocytopenia - improving    - normal wbc differential  - hgb stable    - counts trending upward, and would continue to trend over the next few weeks  - no obvious suspect, from a medication standpoint, to explain thrombocytopenia ( although rare reports with pantoprazole, sertraline, etc ). - Transient change in counts may be related to recent UTI, antibiotic    - ANC > 1000    - no further recommendations, other than check CBC in 1-2 weeks after discharge @ the [facility]  - will have my office arrange for CBC home draw at the [facility]    - stable from heme point of view    - if counts remain low, will need to measure aggressiveness of possible workup against current comorbidities        As always, thank you for allowing me the opportunity to participate in the care of your patient. Please do not hesitate to contact me with questions or concerns regarding further management. Magen Friend DO, MS  Oncology/Hematology Care    Please contact via:  1. [employer]  2.   Cell Phone:  [phone]    5/23/2021   8:39 AM

## 2021-05-24 LAB
BLOOD CULTURE, ROUTINE: NORMAL
CULTURE, BLOOD 2: NORMAL

## 2021-06-21 PROBLEM — N39.0 UTI (URINARY TRACT INFECTION): Status: RESOLVED | Noted: 2021-05-22 | Resolved: 2021-06-21

## 2021-08-23 NOTE — ED NOTES
Pt has attempted to get multiple times and has been redirected. This RN has been sitting across from pts room to keep an eye on her.       Ivelisse Brunner RN  06/23/20 6819 Good

## 2021-10-09 ENCOUNTER — TELEPHONE (OUTPATIENT)
Dept: ONCOLOGY | Age: 81
End: 2021-10-09

## 2021-10-09 NOTE — TELEPHONE ENCOUNTER
Pt called for appointment. It appears that the physician responsible for the orders has not received privileges here. Pt encouraged to contact her physician for further instructions on where she may be able to receive her injection.

## 2023-03-10 ENCOUNTER — APPOINTMENT (OUTPATIENT)
Dept: GENERAL RADIOLOGY | Age: 83
End: 2023-03-10
Payer: MEDICARE

## 2023-03-10 ENCOUNTER — HOSPITAL ENCOUNTER (EMERGENCY)
Age: 83
Discharge: HOME | End: 2023-03-11
Attending: EMERGENCY MEDICINE
Payer: MEDICARE

## 2023-03-10 VITALS
SYSTOLIC BLOOD PRESSURE: 138 MMHG | HEIGHT: 64 IN | DIASTOLIC BLOOD PRESSURE: 97 MMHG | RESPIRATION RATE: 26 BRPM | HEART RATE: 80 BPM | BODY MASS INDEX: 21.72 KG/M2 | WEIGHT: 127.21 LBS | OXYGEN SATURATION: 96 % | TEMPERATURE: 97.7 F

## 2023-03-10 DIAGNOSIS — S43.004A DISLOCATION OF RIGHT SHOULDER JOINT, INITIAL ENCOUNTER: ICD-10-CM
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
## 2023-03-10 PROCEDURE — 6360000002 HC RX W HCPCS: Performed by: PHYSICIAN ASSISTANT

## 2023-03-10 PROCEDURE — 23655 CLTX SHO DSLC W/MNPJ W/ANES: CPT

## 2023-03-10 PROCEDURE — 73030 X-RAY EXAM OF SHOULDER: CPT

## 2023-03-10 PROCEDURE — 99284 EMERGENCY DEPT VISIT MOD MDM: CPT

## 2023-03-10 PROCEDURE — 2500000003 HC RX 250 WO HCPCS: Performed by: PHYSICIAN ASSISTANT

## 2023-03-10 PROCEDURE — 6370000000 HC RX 637 (ALT 250 FOR IP): Performed by: PHYSICIAN ASSISTANT

## 2023-03-10 PROCEDURE — 96374 THER/PROPH/DIAG INJ IV PUSH: CPT

## 2023-03-10 PROCEDURE — 73060 X-RAY EXAM OF HUMERUS: CPT

## 2023-03-10 RX ORDER — FENTANYL CITRATE 50 UG/ML
50 INJECTION, SOLUTION INTRAMUSCULAR; INTRAVENOUS ONCE
Status: COMPLETED | OUTPATIENT
Start: 2023-03-10 | End: 2023-03-10

## 2023-03-10 RX ORDER — LIDOCAINE HYDROCHLORIDE 10 MG/ML
10 INJECTION, SOLUTION EPIDURAL; INFILTRATION; INTRACAUDAL; PERINEURAL ONCE
Status: COMPLETED | OUTPATIENT
Start: 2023-03-10 | End: 2023-03-10

## 2023-03-10 RX ORDER — ACETAMINOPHEN 500 MG
1000 TABLET ORAL ONCE
Status: COMPLETED | OUTPATIENT
Start: 2023-03-10 | End: 2023-03-10

## 2023-03-10 RX ADMIN — ACETAMINOPHEN 1000 MG: 500 TABLET ORAL at 20:50

## 2023-03-10 RX ADMIN — FENTANYL CITRATE 50 MCG: 50 INJECTION, SOLUTION INTRAMUSCULAR; INTRAVENOUS at 21:50

## 2023-03-10 RX ADMIN — LIDOCAINE HYDROCHLORIDE 10 ML: 10 INJECTION, SOLUTION EPIDURAL; INFILTRATION; INTRACAUDAL; PERINEURAL at 21:19

## 2023-03-10 ASSESSMENT — PAIN DESCRIPTION - DESCRIPTORS: DESCRIPTORS: ACHING

## 2023-03-10 ASSESSMENT — PAIN - FUNCTIONAL ASSESSMENT: PAIN_FUNCTIONAL_ASSESSMENT: 0-10

## 2023-03-10 ASSESSMENT — PAIN DESCRIPTION - ORIENTATION: ORIENTATION: RIGHT

## 2023-03-10 ASSESSMENT — PAIN SCALES - GENERAL
PAINLEVEL_OUTOF10: 8

## 2023-03-10 ASSESSMENT — PAIN DESCRIPTION - LOCATION: LOCATION: ARM

## 2023-03-10 ASSESSMENT — PAIN DESCRIPTION - PAIN TYPE: TYPE: ACUTE PAIN

## 2023-03-10 ASSESSMENT — ENCOUNTER SYMPTOMS
NAUSEA: 0
SHORTNESS OF BREATH: 0
ABDOMINAL PAIN: 0
VOMITING: 0
CHEST TIGHTNESS: 0

## 2023-03-10 ASSESSMENT — PAIN DESCRIPTION - FREQUENCY: FREQUENCY: CONTINUOUS

## 2023-03-11 ASSESSMENT — PAIN SCALES - GENERAL: PAINLEVEL_OUTOF10: 1

## 2023-03-11 NOTE — ED PROVIDER NOTES
[address] [facility] [address] ENCOUNTER          PHYSICIAN ASSISTANT NOTE       Date of evaluation: 3/10/2023    Chief Complaint     Fall (Patient tried helping  ambulate and fell onto right arm. Right arm/shoulder pain 8/10)    History of Present Illness     Beryle Becker is a 79 y.o. female who presents to the emergency department for evaluation after mechanical fall with right shoulder pain. According to report from the nurse at the [facility] she was trying to help her  walk when she tripped and fell landing on her right shoulder. She denies hitting her head or loss of consciousness. She is not on blood thinners. Currently she has pain in her entire right arm that is worse in her right shoulder. She has no neck pain or back pain, no chest pain, shortness of breath, abdominal pain, nausea or vomiting. She has not had any medication for relief of her pain. ASSESSMENT / PLAN  (MEDICAL DECISION MAKING)     INITIAL VITALS: BP: (!) 142/65, Temp: 97.7 °F (36.5 °C), Heart Rate: 73, Resp: 16, SpO2: 94 %    Beryle Becker is a 79 y.o. female presenting after mechanical fall. Medical Decision Making  Patient presented to the emergency department for evaluation after mechanical fall with right shoulder pain. She fell trying to help her  walk and tripped landing on her right shoulder with no head trauma. She is not on blood thinners. On exam, she has a deformity to the right shoulder with limited movement and significant pain. X-ray completed of the shoulder and humerus which showed an anterior right shoulder dislocation. She was neurovascularly intact in the right arm. Intra-articular injection done in the right shoulder with 1% lidocaine, she tolerated this without complication. Subsequently given fentanyl and attempted closed reduction and was unsuccessful. My attending then performed closed reduction and was able to successfully reduce the shoulder.   Postreduction x-rays confirmed placement. Patient was feeling much better on repeat exam and was placed in a sling. She was able to ambulate to the bathroom independently. She lives with her  and has home care from 6 AM to midnight, and daughter is comfortable with plan for discharge home in good condition. No other injuries or findings on physical exam warranting further investigation or monitoring. She will be discharged in good condition, and encouraged to follow-up with orthopedics on an outpatient basis. Problems Addressed:  Dislocation of right shoulder joint, initial encounter: acute illness or injury  Fall, accidental, initial encounter: acute illness or injury    Amount and/or Complexity of Data Reviewed  Radiology: ordered. Risk  OTC drugs. Prescription drug management. This patient was also evaluated by the attending physician. All care plans were discussed and agreed upon. Clinical Impression     1. Fall, accidental, initial encounter    2. Dislocation of right shoulder joint, initial encounter        Disposition     PATIENT REFERRED TO:  Lyle Saenz MD  [address]  [phone]        DISCHARGE MEDICATIONS:  New Prescriptions    No medications on file       DISPOSITION Decision To Discharge 03/10/2023 11:53:58 PM      Diagnostic Results and Other Data     RADIOLOGY:  XR SHOULDER RIGHT (MIN 2 VIEWS)   Final Result      Interval close reduction of right humerus in near anatomic alignment. XR SHOULDER RIGHT (MIN 2 VIEWS)   Final Result      Anteroinferior dislocation of right shoulder. XR HUMERUS RIGHT (MIN 2 VIEWS)   Final Result      Anteroinferior dislocation of right shoulder. LABS:   No results found for this visit on 03/10/23. ED BEDSIDE ULTRASOUND:  No results found.     RECENT VITALS:  BP: (!) 138/97, Temp: 97.7 °F (36.5 °C), Heart Rate: 80, Resp: 26, SpO2: 96 %     Procedures   None    ED Course     Nursing Notes, Past Medical Hx,Past Surgical Hx, Social Hx, Allergies, and Family Hx were reviewed. The patient was given the following medications:  Orders Placed This Encounter   Medications    acetaminophen (TYLENOL) tablet 1,000 mg    lidocaine PF 1 % injection 10 mL    fentaNYL (SUBLIMAZE) injection 50 mcg       CONSULTS:  None    Review of Systems     Review of Systems   Constitutional:  Negative for chills, diaphoresis and fatigue. Respiratory:  Negative for chest tightness and shortness of breath. Cardiovascular:  Negative for chest pain. Gastrointestinal:  Negative for abdominal pain, nausea and vomiting. Musculoskeletal:  Positive for arthralgias (right shoulder pain). Skin:  Negative for wound. Past Medical, Surgical, Family, and Social History     She has a past medical history of Anxiety, Aortic calcification ([address] Arteriosclerosis, Breast cancer ([address] Carcinoma in situ of breast, Cardiomegaly, Carotid artery disease ([address] Cerebrovascular disease, unspecified, Chest pain, COPD (chronic obstructive pulmonary disease) ([address] Coronary artery calcification seen on CT scan, Decreased libido, Depression, Dysthymia, Essential hypertension, Gastroesophageal reflux disease without esophagitis, GERD (gastroesophageal reflux disease), Hx of diseases NEC, Hyperlipidemia, Insomnia, Left thyroid nodule, Low back pain, Malignant neoplasm of ovary ([address] Memory loss, Osteoporosis, Ovarian cancer ([address] Pain in joint, pelvic region and thigh, Paralysis of vocal cords or larynx, unspecified, Peripheral vascular disease, unspecified ([address] Personal history of malignant neoplasm of ovary, Scoliosis, Spondylolisthesis, Urinary incontinence, and Vocal cord dysfunction. She has a past surgical history that includes Colonoscopy (August 25, 2009); Facial cosmetic surgery (July 31, 2012); Facial cosmetic surgery (1999); Tonsillectomy; Total abdominal hysterectomy w/ bilateral salpingoophorectomy (1986);  Abdominal exploration surgery (1986); Breast lumpectomy (2002); Breast reduction surgery (1981); Breast reconstruction; cystourethroscopy (February 27, 2013); Colonoscopy (01/18/2017); and Mastectomy, bilateral.  Her family history includes Cancer (age of onset: 50) in her daughter; Dementia (age of onset: 68) in her brother. She reports that she has quit smoking. Her smoking use included cigarettes. She has never used smokeless tobacco. She reports current alcohol use. She reports that she does not use drugs. Medications     Previous Medications    ASCORBIC ACID (VITAMIN C) 500 MG TABLET    Take 500 mg by mouth daily. ASPIRIN 81 MG EC TABLET    Take 81 mg by mouth daily. CALCIUM-VITAMIN D (OSCAL-500) 500-200 MG-UNIT PER TABLET    Take 1 tablet by mouth daily. CARVEDILOL (COREG) 3.125 MG TABLET    Take 3.125 mg by mouth 2 times daily (with meals)    CHOLECALCIFEROL (VITAMIN D) 50 MCG (2000 UT) CAPS CAPSULE    Take 2,000 Units by mouth daily     DEXTRAN 70-HYPROMELLOSE (TEARS PURE) 0.1-0.3 % SOLN OPTHALMIC SOLUTION    Place 1 drop into both eyes as needed (dry eyes)    DONEPEZIL (ARICEPT) 10 MG TABLET    Take 10 mg by mouth nightly     LANSOPRAZOLE (PREVACID) 30 MG DELAYED RELEASE CAPSULE    Take 30 mg by mouth daily    MEMANTINE (NAMENDA) 10 MG TABLET    Take 10 mg by mouth 2 times daily    MULTIPLE VITAMINS-MINERALS (CENTRUM SILVER PO)    Take 1 tablet by mouth daily. RALOXIFENE (EVISTA) 60 MG TABLET    Take 60 mg by mouth daily    SERTRALINE (ZOLOFT) 50 MG TABLET    Take 50 mg by mouth 2 times daily    SOLIFENACIN (VESICARE) 10 MG TABLET    Take 5 mg by mouth nightly    TEMAZEPAM (RESTORIL) 30 MG CAPSULE    Take 30 mg by mouth nightly. VITAMIN B-12 (CYANOCOBALAMIN) 1000 MCG TABLET    Take 1,000 mcg by mouth daily        Allergies     She is allergic to penicillins.     Physical Exam     INITIAL VITALS: BP: (!) 142/65, Temp: 97.7 °F (36.5 °C), Heart Rate: 73, Resp: 16, SpO2: 94 %  Physical Exam  Vitals and nursing note reviewed. Constitutional:       General: She is not in acute distress. Appearance: Normal appearance. She is normal weight. She is not ill-appearing, toxic-appearing or diaphoretic. Cardiovascular:      Rate and Rhythm: Normal rate and regular rhythm. Pulses: Normal pulses. Heart sounds: Normal heart sounds. No murmur heard. No friction rub. No gallop. Pulmonary:      Effort: Pulmonary effort is normal. No respiratory distress. Breath sounds: Normal breath sounds. No stridor. No wheezing. Musculoskeletal:      Comments: Tenderness to palpation along the right shoulder extending to the proximal humerus with deformity, and sulcus at the shoulder, no ecchymosis or erythema, no open wounds, abrasions or lacerations, intact distal pulse, able to make okay sign with the right hand, able to spread fingers wide, able to cross index and long finger and able to flex and extend wrist   Neurological:      General: No focal deficit present. Mental Status: She is alert. Cranial Nerves: No cranial nerve deficit.    Psychiatric:         Mood and Affect: Mood normal.         Behavior: Behavior normal.        Azra Saenz PA-C  03/11/23 0014

## 2023-03-11 NOTE — ED PROVIDER NOTES
[address] ENCOUNTER          ATTENDING PHYSICIAN NOTE       Date of evaluation: 3/10/2023    ADDENDUM:      Care of this patient was assumed from Dr Mani Valenzuela. The patient was seen for Fall (Patient tried helping  ambulate and fell onto right arm. Right arm/shoulder pain 8/10)  . The patient's initial evaluation and plan have been discussed with the prior provider who initially evaluated the patient. Nursing Notes, Past Medical Hx, Past Surgical Hx, Social Hx, Allergies, and Family Hx were all reviewed. ASSESSMENT / PLAN  (MEDICAL DECISION MAKING)     Beryle Becker is a 79 y.o. female with anteroinferior shoulder dislocation. .  Able to reduce at bedside without requiring procedural sedation. Confirmed with radiograph. Will place in sling and follow-up with orthopedics. Medical Decision Making  Amount and/or Complexity of Data Reviewed  Radiology: ordered. Risk  OTC drugs. Prescription drug management. Clinical Impression     1. Fall, accidental, initial encounter    2. Dislocation of right shoulder joint, initial encounter        Disposition     PATIENT REFERRED TO:  Candelaria Abebe MD  [address] [facility] [place] [address]  [phone]            DISCHARGE MEDICATIONS:  New Prescriptions    No medications on file       DISPOSITION Decision To Discharge 03/10/2023 11:53:58 PM        Diagnostic Results and Other Data                                   RADIOLOGY:  XR SHOULDER RIGHT (MIN 2 VIEWS)   Final Result      Interval close reduction of right humerus in near anatomic alignment. XR SHOULDER RIGHT (MIN 2 VIEWS)   Final Result      Anteroinferior dislocation of right shoulder. XR HUMERUS RIGHT (MIN 2 VIEWS)   Final Result      Anteroinferior dislocation of right shoulder. LABS:   No results found for this visit on 03/10/23.   EKG     MOST RECENT VITALS:  BP: (!) 138/97, Temp: 97.7 °F (36.5 °C), Heart Rate: 80, Resp: 26, SpO2: 96 %     Procedures     Ortho Injury    Date/Time: 3/11/2023 12:03 AM  Performed by: Dimitri Joseph MD  Authorized by: María Brar MD   Consent: Verbal consent obtained. Risks and benefits: risks, benefits and alternatives were discussed  Consent given by: patient  Injury location: shoulder  Location details: right shoulder  Injury type: dislocation  Dislocation type: anterior  Chronicity: new  Pre-procedure neurovascular assessment: neurovascularly intact  Pre-procedure distal perfusion: normal  Pre-procedure neurological function: normal  Pre-procedure range of motion: reduced    Anesthesia:  Local anesthesia used: yes  Local Anesthetic: lidocaine 1% without epinephrine  Manipulation performed: yes  Reduction method: anterior flexion, internal rotation.   Reduction successful: yes  X-ray confirmed reduction: yes  Immobilization: sling  Post-procedure neurovascular assessment: post-procedure neurovascularly intact  Post-procedure distal perfusion: normal  Post-procedure neurological function: normal  Post-procedure range of motion: normal  Patient tolerance: patient tolerated the procedure well with no immediate complications          ED Course          The patient was given the following medications:  Orders Placed This Encounter   Medications    acetaminophen (TYLENOL) tablet 1,000 mg    lidocaine PF 1 % injection 10 mL    fentaNYL (SUBLIMAZE) injection 50 mcg       CONSULTS:  None        Dimitri Joseph MD  03/11/23 0005

## 2023-03-11 NOTE — ED PROVIDER NOTES
ED Attending Attestation Note     Date of evaluation: 3/10/2023    This patient was seen by the advance practice provider. I have seen and examined the patient, agree with the workup, evaluation, management and diagnosis. The care plan has been discussed. My assessment reveals patient with shoulder pain after fall. Have right shoulder dislocation on x-ray. Particular anesthesia as well as pain medication were administered and an original attempt without sedation was tried without success.   Care will be turned over to the oncoming provider who will likely need to assist with sedation and reduction     Dionisio Shepherd MD  03/10/23 [id number]

## 2023-03-11 NOTE — DISCHARGE INSTRUCTIONS
You were treated for shoulder dislocation. Shoulder was reduced. Wear sling for support and comfort, limit any movement over the next few days. Follow-up with orthopedics, call to schedule an appointment. Return as needed for further concerns or worsening symptoms.